# Patient Record
Sex: FEMALE | Race: WHITE | Employment: UNEMPLOYED | URBAN - NONMETROPOLITAN AREA
[De-identification: names, ages, dates, MRNs, and addresses within clinical notes are randomized per-mention and may not be internally consistent; named-entity substitution may affect disease eponyms.]

---

## 2018-01-03 ENCOUNTER — NURSE TRIAGE (OUTPATIENT)
Dept: ADMINISTRATIVE | Age: 23
End: 2018-01-03

## 2018-01-04 NOTE — TELEPHONE ENCOUNTER
oral intake without significant weight loss or symptoms of dehydration     - SEVERE: 6 or more times/day, vomits everything or nearly everything, with significant weight loss, symptoms of dehydration       Severe   2. ONSET: \"When did the vomiting begin? \"      Today   3. FLUIDS: \"What fluids or food have you vomited up today? \" \"Have you been able to keep any fluids down? \"     Everything she drank. 4. ABDOMINAL PAIN: \"Are your having any abdominal pain? \" If yes : \"How bad is it and what does it feel like? \" (e.g., crampy, dull, intermittent, constant)       Cramping. 5. DIARRHEA: \"Is there any diarrhea? \" If so, ask: \"How many times today? \"       Once today   6. CONTACTS: \"Is there anyone else in the family with the same symptoms? \"       No   7. CAUSE: \"What do you think is causing your vomiting? \"      unknown   8. HYDRATION STATUS: \"Any signs of dehydration? \" (e.g., dry mouth [not only dry lips], too weak to stand) \"When did you last urinate? \"      Just urinated. Doesn't know the color of the urine. 9. OTHER SYMPTOMS: \"Do you have any other symptoms? \" (e.g., fever, headache, vertigo, vomiting blood or coffee grounds)      No   10. PREGNANCY: \"Is there any chance you are pregnant? \" \"When was your last menstrual period? \"      No    Protocols used: DIARRHEA-ADULT-, VOMITING-ADULT-AH

## 2018-11-14 ENCOUNTER — NURSE TRIAGE (OUTPATIENT)
Dept: ADMINISTRATIVE | Age: 23
End: 2018-11-14

## 2018-12-09 ENCOUNTER — APPOINTMENT (OUTPATIENT)
Dept: GENERAL RADIOLOGY | Age: 23
DRG: 723 | End: 2018-12-09
Payer: COMMERCIAL

## 2018-12-09 ENCOUNTER — HOSPITAL ENCOUNTER (INPATIENT)
Age: 23
LOS: 1 days | Discharge: HOME | DRG: 723 | End: 2018-12-10
Attending: FAMILY MEDICINE | Admitting: HOSPITALIST
Payer: COMMERCIAL

## 2018-12-09 DIAGNOSIS — B34.9 VIRAL ILLNESS: ICD-10-CM

## 2018-12-09 DIAGNOSIS — R00.0 SINUS TACHYCARDIA: ICD-10-CM

## 2018-12-09 DIAGNOSIS — E86.0 DEHYDRATION: Primary | ICD-10-CM

## 2018-12-09 LAB
ALBUMIN SERPL-MCNC: 4.6 G/DL (ref 3.5–5.1)
ALP BLD-CCNC: 65 U/L (ref 38–126)
ALT SERPL-CCNC: 17 U/L (ref 11–66)
ANION GAP SERPL CALCULATED.3IONS-SCNC: 14 MEQ/L (ref 8–16)
AST SERPL-CCNC: 22 U/L (ref 5–40)
BASOPHILS # BLD: 0.3 %
BASOPHILS ABSOLUTE: 0 THOU/MM3 (ref 0–0.1)
BILIRUB SERPL-MCNC: 0.3 MG/DL (ref 0.3–1.2)
BILIRUBIN DIRECT: < 0.2 MG/DL (ref 0–0.3)
BILIRUBIN URINE: NEGATIVE
BLOOD, URINE: NEGATIVE
BUN BLDV-MCNC: 10 MG/DL (ref 7–22)
CALCIUM SERPL-MCNC: 9.5 MG/DL (ref 8.5–10.5)
CHARACTER, URINE: CLEAR
CHLORIDE BLD-SCNC: 98 MEQ/L (ref 98–111)
CO2: 23 MEQ/L (ref 23–33)
COLOR: YELLOW
CREAT SERPL-MCNC: 0.7 MG/DL (ref 0.4–1.2)
EKG ATRIAL RATE: 148 BPM
EKG P AXIS: 58 DEGREES
EKG P-R INTERVAL: 128 MS
EKG Q-T INTERVAL: 276 MS
EKG QRS DURATION: 96 MS
EKG QTC CALCULATION (BAZETT): 433 MS
EKG R AXIS: 85 DEGREES
EKG T AXIS: 56 DEGREES
EKG VENTRICULAR RATE: 148 BPM
EOSINOPHIL # BLD: 0.2 %
EOSINOPHILS ABSOLUTE: 0 THOU/MM3 (ref 0–0.4)
ERYTHROCYTE [DISTWIDTH] IN BLOOD BY AUTOMATED COUNT: 13.2 % (ref 11.5–14.5)
ERYTHROCYTE [DISTWIDTH] IN BLOOD BY AUTOMATED COUNT: 43.6 FL (ref 35–45)
FLU A ANTIGEN: NEGATIVE
FLU B ANTIGEN: NEGATIVE
GFR SERPL CREATININE-BSD FRML MDRD: > 90 ML/MIN/1.73M2
GLUCOSE BLD-MCNC: 128 MG/DL (ref 70–108)
GLUCOSE URINE: NEGATIVE MG/DL
HCT VFR BLD CALC: 40.7 % (ref 37–47)
HEMOGLOBIN: 13.9 GM/DL (ref 12–16)
IMMATURE GRANS (ABS): 0.05 THOU/MM3 (ref 0–0.07)
IMMATURE GRANULOCYTES: 0.4 %
KETONES, URINE: NEGATIVE
LACTIC ACID: 2 MMOL/L (ref 0.5–2.2)
LEUKOCYTE ESTERASE, URINE: NEGATIVE
LIPASE: 18.5 U/L (ref 5.6–51.3)
LYMPHOCYTES # BLD: 2.6 %
LYMPHOCYTES ABSOLUTE: 0.3 THOU/MM3 (ref 1–4.8)
MCH RBC QN AUTO: 30.7 PG (ref 26–33)
MCHC RBC AUTO-ENTMCNC: 34.2 GM/DL (ref 32.2–35.5)
MCV RBC AUTO: 89.8 FL (ref 81–99)
MONOCYTES # BLD: 6.7 %
MONOCYTES ABSOLUTE: 0.9 THOU/MM3 (ref 0.4–1.3)
NITRITE, URINE: NEGATIVE
NUCLEATED RED BLOOD CELLS: 0 /100 WBC
OSMOLALITY CALCULATION: 270.8 MOSMOL/KG (ref 275–300)
PH UA: 7
PLATELET # BLD: 219 THOU/MM3 (ref 130–400)
PMV BLD AUTO: 10.9 FL (ref 9.4–12.4)
POTASSIUM SERPL-SCNC: 3.7 MEQ/L (ref 3.5–5.2)
PREGNANCY, SERUM: NEGATIVE
PROCALCITONIN: 0.48 NG/ML (ref 0.01–0.09)
PROTEIN UA: NEGATIVE
RBC # BLD: 4.53 MILL/MM3 (ref 4.2–5.4)
SEG NEUTROPHILS: 89.8 %
SEGMENTED NEUTROPHILS ABSOLUTE COUNT: 11.6 THOU/MM3 (ref 1.8–7.7)
SODIUM BLD-SCNC: 135 MEQ/L (ref 135–145)
SPECIFIC GRAVITY, URINE: 1.02 (ref 1–1.03)
TOTAL PROTEIN: 8 G/DL (ref 6.1–8)
UROBILINOGEN, URINE: 1 EU/DL
WBC # BLD: 12.9 THOU/MM3 (ref 4.8–10.8)

## 2018-12-09 PROCEDURE — 82248 BILIRUBIN DIRECT: CPT

## 2018-12-09 PROCEDURE — 99219 PR INITIAL OBSERVATION CARE/DAY 50 MINUTES: CPT | Performed by: HOSPITALIST

## 2018-12-09 PROCEDURE — 99285 EMERGENCY DEPT VISIT HI MDM: CPT

## 2018-12-09 PROCEDURE — 84145 PROCALCITONIN (PCT): CPT

## 2018-12-09 PROCEDURE — 87804 INFLUENZA ASSAY W/OPTIC: CPT

## 2018-12-09 PROCEDURE — 96361 HYDRATE IV INFUSION ADD-ON: CPT

## 2018-12-09 PROCEDURE — 6360000002 HC RX W HCPCS

## 2018-12-09 PROCEDURE — 93005 ELECTROCARDIOGRAM TRACING: CPT | Performed by: FAMILY MEDICINE

## 2018-12-09 PROCEDURE — 80053 COMPREHEN METABOLIC PANEL: CPT

## 2018-12-09 PROCEDURE — 83690 ASSAY OF LIPASE: CPT

## 2018-12-09 PROCEDURE — 84703 CHORIONIC GONADOTROPIN ASSAY: CPT

## 2018-12-09 PROCEDURE — 96374 THER/PROPH/DIAG INJ IV PUSH: CPT

## 2018-12-09 PROCEDURE — 2580000003 HC RX 258: Performed by: FAMILY MEDICINE

## 2018-12-09 PROCEDURE — 96376 TX/PRO/DX INJ SAME DRUG ADON: CPT

## 2018-12-09 PROCEDURE — 71046 X-RAY EXAM CHEST 2 VIEWS: CPT

## 2018-12-09 PROCEDURE — 83605 ASSAY OF LACTIC ACID: CPT

## 2018-12-09 PROCEDURE — 96375 TX/PRO/DX INJ NEW DRUG ADDON: CPT

## 2018-12-09 PROCEDURE — 36415 COLL VENOUS BLD VENIPUNCTURE: CPT

## 2018-12-09 PROCEDURE — 81003 URINALYSIS AUTO W/O SCOPE: CPT

## 2018-12-09 PROCEDURE — 6360000002 HC RX W HCPCS: Performed by: FAMILY MEDICINE

## 2018-12-09 PROCEDURE — 85025 COMPLETE CBC W/AUTO DIFF WBC: CPT

## 2018-12-09 RX ORDER — ONDANSETRON 2 MG/ML
4 INJECTION INTRAMUSCULAR; INTRAVENOUS ONCE
Status: COMPLETED | OUTPATIENT
Start: 2018-12-09 | End: 2018-12-09

## 2018-12-09 RX ORDER — METOCLOPRAMIDE HYDROCHLORIDE 5 MG/ML
10 INJECTION INTRAMUSCULAR; INTRAVENOUS ONCE
Status: COMPLETED | OUTPATIENT
Start: 2018-12-10 | End: 2018-12-10

## 2018-12-09 RX ORDER — ACETAMINOPHEN 325 MG/1
650 TABLET ORAL ONCE
Status: DISCONTINUED | OUTPATIENT
Start: 2018-12-09 | End: 2018-12-10 | Stop reason: HOSPADM

## 2018-12-09 RX ORDER — METOCLOPRAMIDE HYDROCHLORIDE 5 MG/ML
INJECTION INTRAMUSCULAR; INTRAVENOUS
Status: COMPLETED
Start: 2018-12-09 | End: 2018-12-10

## 2018-12-09 RX ORDER — ONDANSETRON 2 MG/ML
INJECTION INTRAMUSCULAR; INTRAVENOUS
Status: COMPLETED
Start: 2018-12-09 | End: 2018-12-09

## 2018-12-09 RX ORDER — 0.9 % SODIUM CHLORIDE 0.9 %
30 INTRAVENOUS SOLUTION INTRAVENOUS ONCE
Status: COMPLETED | OUTPATIENT
Start: 2018-12-09 | End: 2018-12-09

## 2018-12-09 RX ADMIN — ONDANSETRON 4 MG: 2 INJECTION INTRAMUSCULAR; INTRAVENOUS at 23:23

## 2018-12-09 RX ADMIN — ONDANSETRON 4 MG: 2 INJECTION INTRAMUSCULAR; INTRAVENOUS at 21:18

## 2018-12-09 RX ADMIN — SODIUM CHLORIDE 1905 ML: 9 INJECTION, SOLUTION INTRAVENOUS at 21:18

## 2018-12-09 ASSESSMENT — ENCOUNTER SYMPTOMS
NAUSEA: 1
ABDOMINAL PAIN: 1
EYE DISCHARGE: 0
VOMITING: 1
WHEEZING: 0
BACK PAIN: 1
RHINORRHEA: 0
SORE THROAT: 0
COUGH: 0
SHORTNESS OF BREATH: 0
DIARRHEA: 0
EYE PAIN: 0

## 2018-12-10 ENCOUNTER — APPOINTMENT (OUTPATIENT)
Dept: GENERAL RADIOLOGY | Age: 23
DRG: 723 | End: 2018-12-10
Payer: COMMERCIAL

## 2018-12-10 VITALS
OXYGEN SATURATION: 100 % | DIASTOLIC BLOOD PRESSURE: 55 MMHG | BODY MASS INDEX: 23.9 KG/M2 | SYSTOLIC BLOOD PRESSURE: 99 MMHG | HEIGHT: 64 IN | TEMPERATURE: 99.4 F | RESPIRATION RATE: 24 BRPM | WEIGHT: 140 LBS | HEART RATE: 120 BPM

## 2018-12-10 PROBLEM — R50.9 FEVER AND CHILLS: Status: ACTIVE | Noted: 2018-12-10

## 2018-12-10 PROBLEM — R65.10 SIRS (SYSTEMIC INFLAMMATORY RESPONSE SYNDROME) (HCC): Status: ACTIVE | Noted: 2018-12-10

## 2018-12-10 LAB
AMPHETAMINE+METHAMPHETAMINE URINE SCREEN: NEGATIVE
BARBITURATE QUANTITATIVE URINE: NEGATIVE
BENZODIAZEPINE QUANTITATIVE URINE: NEGATIVE
CANNABINOID QUANTITATIVE URINE: NEGATIVE
COCAINE METABOLITE QUANTITATIVE URINE: NEGATIVE
GROUP A STREP CULTURE, REFLEX: NEGATIVE
OPIATES, URINE: NEGATIVE
OXYCODONE: NEGATIVE
PHENCYCLIDINE QUANTITATIVE URINE: NEGATIVE
REFLEX THROAT C + S: NORMAL
TROPONIN T: < 0.01 NG/ML

## 2018-12-10 PROCEDURE — 96365 THER/PROPH/DIAG IV INF INIT: CPT

## 2018-12-10 PROCEDURE — 87040 BLOOD CULTURE FOR BACTERIA: CPT

## 2018-12-10 PROCEDURE — 1200000000 HC SEMI PRIVATE

## 2018-12-10 PROCEDURE — 6360000002 HC RX W HCPCS

## 2018-12-10 PROCEDURE — 6370000000 HC RX 637 (ALT 250 FOR IP)

## 2018-12-10 PROCEDURE — 86038 ANTINUCLEAR ANTIBODIES: CPT

## 2018-12-10 PROCEDURE — 86255 FLUORESCENT ANTIBODY SCREEN: CPT

## 2018-12-10 PROCEDURE — 96375 TX/PRO/DX INJ NEW DRUG ADDON: CPT

## 2018-12-10 PROCEDURE — 6370000000 HC RX 637 (ALT 250 FOR IP): Performed by: INTERNAL MEDICINE

## 2018-12-10 PROCEDURE — 84484 ASSAY OF TROPONIN QUANT: CPT

## 2018-12-10 PROCEDURE — 2580000003 HC RX 258: Performed by: HOSPITALIST

## 2018-12-10 PROCEDURE — 80307 DRUG TEST PRSMV CHEM ANLYZR: CPT

## 2018-12-10 PROCEDURE — 6370000000 HC RX 637 (ALT 250 FOR IP): Performed by: HOSPITALIST

## 2018-12-10 PROCEDURE — 99239 HOSP IP/OBS DSCHRG MGMT >30: CPT | Performed by: FAMILY MEDICINE

## 2018-12-10 PROCEDURE — 71046 X-RAY EXAM CHEST 2 VIEWS: CPT

## 2018-12-10 PROCEDURE — 96361 HYDRATE IV INFUSION ADD-ON: CPT

## 2018-12-10 PROCEDURE — 6360000002 HC RX W HCPCS: Performed by: HOSPITALIST

## 2018-12-10 PROCEDURE — 93010 ELECTROCARDIOGRAM REPORT: CPT | Performed by: NUCLEAR MEDICINE

## 2018-12-10 PROCEDURE — 74018 RADEX ABDOMEN 1 VIEW: CPT

## 2018-12-10 PROCEDURE — 87070 CULTURE OTHR SPECIMN AEROBIC: CPT

## 2018-12-10 PROCEDURE — 36415 COLL VENOUS BLD VENIPUNCTURE: CPT

## 2018-12-10 PROCEDURE — 2709999900 HC NON-CHARGEABLE SUPPLY

## 2018-12-10 PROCEDURE — G0378 HOSPITAL OBSERVATION PER HR: HCPCS

## 2018-12-10 PROCEDURE — 87880 STREP A ASSAY W/OPTIC: CPT

## 2018-12-10 RX ORDER — SODIUM CHLORIDE 0.9 % (FLUSH) 0.9 %
10 SYRINGE (ML) INJECTION PRN
Status: DISCONTINUED | OUTPATIENT
Start: 2018-12-10 | End: 2018-12-10 | Stop reason: HOSPADM

## 2018-12-10 RX ORDER — IBUPROFEN 400 MG/1
400 TABLET ORAL ONCE
Status: COMPLETED | OUTPATIENT
Start: 2018-12-10 | End: 2018-12-10

## 2018-12-10 RX ORDER — ACETAMINOPHEN 325 MG/1
650 TABLET ORAL EVERY 6 HOURS PRN
Qty: 120 TABLET | Refills: 3 | COMMUNITY
Start: 2018-12-10 | End: 2021-09-21

## 2018-12-10 RX ORDER — SODIUM CHLORIDE 0.9 % (FLUSH) 0.9 %
10 SYRINGE (ML) INJECTION EVERY 12 HOURS SCHEDULED
Status: DISCONTINUED | OUTPATIENT
Start: 2018-12-10 | End: 2018-12-10 | Stop reason: HOSPADM

## 2018-12-10 RX ORDER — LABETALOL HYDROCHLORIDE 5 MG/ML
10 INJECTION, SOLUTION INTRAVENOUS EVERY 4 HOURS PRN
Status: DISCONTINUED | OUTPATIENT
Start: 2018-12-10 | End: 2018-12-10 | Stop reason: HOSPADM

## 2018-12-10 RX ORDER — 0.9 % SODIUM CHLORIDE 0.9 %
1000 INTRAVENOUS SOLUTION INTRAVENOUS ONCE
Status: COMPLETED | OUTPATIENT
Start: 2018-12-10 | End: 2018-12-10

## 2018-12-10 RX ORDER — ACETAMINOPHEN 650 MG/1
SUPPOSITORY RECTAL
Status: COMPLETED
Start: 2018-12-10 | End: 2018-12-10

## 2018-12-10 RX ORDER — ACETAMINOPHEN 650 MG/1
650 SUPPOSITORY RECTAL ONCE
Status: COMPLETED | OUTPATIENT
Start: 2018-12-10 | End: 2018-12-10

## 2018-12-10 RX ORDER — METOCLOPRAMIDE 10 MG/1
10 TABLET ORAL 2 TIMES DAILY PRN
Status: DISCONTINUED | OUTPATIENT
Start: 2018-12-10 | End: 2018-12-10 | Stop reason: HOSPADM

## 2018-12-10 RX ORDER — MORPHINE SULFATE 2 MG/ML
2 INJECTION, SOLUTION INTRAMUSCULAR; INTRAVENOUS EVERY 4 HOURS PRN
Status: DISCONTINUED | OUTPATIENT
Start: 2018-12-10 | End: 2018-12-10 | Stop reason: HOSPADM

## 2018-12-10 RX ORDER — ONDANSETRON 2 MG/ML
4 INJECTION INTRAMUSCULAR; INTRAVENOUS EVERY 6 HOURS PRN
Status: DISCONTINUED | OUTPATIENT
Start: 2018-12-10 | End: 2018-12-10 | Stop reason: HOSPADM

## 2018-12-10 RX ORDER — ONDANSETRON 4 MG/1
4 TABLET, ORALLY DISINTEGRATING ORAL EVERY 8 HOURS PRN
Qty: 20 TABLET | Refills: 0 | Status: ON HOLD | OUTPATIENT
Start: 2018-12-10 | End: 2020-09-28

## 2018-12-10 RX ORDER — ACETAMINOPHEN 325 MG/1
650 TABLET ORAL EVERY 6 HOURS PRN
Status: DISCONTINUED | OUTPATIENT
Start: 2018-12-10 | End: 2018-12-10 | Stop reason: HOSPADM

## 2018-12-10 RX ORDER — MULTIVITAMIN WITH FOLIC ACID 400 MCG
1 TABLET ORAL DAILY
Status: DISCONTINUED | OUTPATIENT
Start: 2018-12-10 | End: 2018-12-10 | Stop reason: HOSPADM

## 2018-12-10 RX ORDER — SODIUM CHLORIDE 9 MG/ML
INJECTION, SOLUTION INTRAVENOUS CONTINUOUS
Status: DISCONTINUED | OUTPATIENT
Start: 2018-12-10 | End: 2018-12-10 | Stop reason: HOSPADM

## 2018-12-10 RX ORDER — IBUPROFEN 400 MG/1
400 TABLET ORAL EVERY 6 HOURS PRN
Qty: 120 TABLET | Refills: 3 | Status: ON HOLD | COMMUNITY
Start: 2018-12-10 | End: 2020-09-28

## 2018-12-10 RX ADMIN — CEFTRIAXONE SODIUM 1 G: 1 INJECTION, POWDER, FOR SOLUTION INTRAMUSCULAR; INTRAVENOUS at 03:43

## 2018-12-10 RX ADMIN — PROCHLORPERAZINE EDISYLATE 10 MG: 5 INJECTION INTRAMUSCULAR; INTRAVENOUS at 02:11

## 2018-12-10 RX ADMIN — ACETAMINOPHEN 650 MG: 650 SUPPOSITORY RECTAL at 00:10

## 2018-12-10 RX ADMIN — METOCLOPRAMIDE 10 MG: 5 INJECTION, SOLUTION INTRAMUSCULAR; INTRAVENOUS at 00:01

## 2018-12-10 RX ADMIN — SODIUM CHLORIDE: 9 INJECTION, SOLUTION INTRAVENOUS at 04:09

## 2018-12-10 RX ADMIN — SODIUM CHLORIDE: 9 INJECTION, SOLUTION INTRAVENOUS at 06:19

## 2018-12-10 RX ADMIN — SODIUM CHLORIDE 1000 ML: 9 INJECTION, SOLUTION INTRAVENOUS at 05:16

## 2018-12-10 RX ADMIN — Medication 10 ML: at 09:10

## 2018-12-10 RX ADMIN — IBUPROFEN 400 MG: 400 TABLET, FILM COATED ORAL at 14:07

## 2018-12-10 RX ADMIN — ACETAMINOPHEN 650 MG: 325 TABLET ORAL at 03:16

## 2018-12-10 RX ADMIN — METOCLOPRAMIDE HYDROCHLORIDE 10 MG: 5 INJECTION INTRAMUSCULAR; INTRAVENOUS at 00:01

## 2018-12-10 RX ADMIN — SODIUM CHLORIDE 1000 ML: 9 INJECTION, SOLUTION INTRAVENOUS at 01:57

## 2018-12-10 RX ADMIN — MORPHINE SULFATE 2 MG: 2 INJECTION, SOLUTION INTRAMUSCULAR; INTRAVENOUS at 02:11

## 2018-12-10 ASSESSMENT — PAIN SCALES - GENERAL
PAINLEVEL_OUTOF10: 3
PAINLEVEL_OUTOF10: 0
PAINLEVEL_OUTOF10: 0
PAINLEVEL_OUTOF10: 3
PAINLEVEL_OUTOF10: 2
PAINLEVEL_OUTOF10: 6

## 2018-12-10 NOTE — PROGRESS NOTES
Recheck of pt's vitals 102.6- 97/ heart rate-20 MEWS score 7 pt resting comfortably Dr. Moris Munoz updated with pt's condition he stated he will add new ordered pt shows no sign of distress bolus of .9 infusing as ordered Hellen from resource updated on pt.

## 2018-12-10 NOTE — PROGRESS NOTES
Discharge teaching and instructions for diagnosis/procedure of viral illness completed with patient using teachback method. AVS reviewed. Printed prescription (Zofran) given to patient. Patient voiced understanding regarding prescriptions, need to schedule follow up appointment with Dr. Elsa Woodson and to call Dr. Sang Seymour if symptoms worsen, and care of self at home. Discharged in a wheelchair to  home with support per mother.

## 2018-12-10 NOTE — DISCHARGE SUMMARY
rales or rhonchi, symmetric air entry  Heart - normal rate, regular rhythm, normal S1, S2, no murmurs, rubs, clicks or gallops  Abdomen - soft, nontender, nondistended, no masses or organomegaly  bowel sounds normal  Obese: No; Protuberant: No   Neurological - alert, oriented, normal speech, no focal findings or movement disorder noted, cranial nerves II through XII intact  Extremities - peripheral pulses normal, no pedal edema, no clubbing or cyanosis  Skin - normal coloration and turgor, no rashes, no suspicious skin lesions noted    Significant Diagnostics:   Radiology:   XR CHEST STANDARD (2 VW)   Final Result   Normal chest radiographs. **This report has been created using voice recognition software. It may contain minor errors which are inherent in voice recognition technology. **      Final report electronically signed by Dr. Ericka Pichardo on 12/10/2018 10:03 AM      XR ABDOMEN (KUB) (SINGLE AP VIEW)   Final Result      Nonobstructive bowel gas pattern with a moderate amount of retained stool in the colon. Final report electronically signed by Dr. Ericka Pichardo on 12/10/2018 8:23 AM      XR CHEST STANDARD (2 VW)   Final Result   1. Increased markings at left lung base. Correlate with mild bronchitis/pneumonitis changes. **This report has been created using voice recognition software. It may contain minor errors which are inherent in voice recognition technology. **      Final report electronically signed by Dr. Romina Miller on 12/9/2018 11:35 PM          Labs:   Recent Results (from the past 72 hour(s))   EKG 12 Lead    Collection Time: 12/09/18  8:58 PM   Result Value Ref Range    Ventricular Rate 148 BPM    Atrial Rate 148 BPM    P-R Interval 128 ms    QRS Duration 96 ms    Q-T Interval 276 ms    QTc Calculation (Bazett) 433 ms    P Axis 58 degrees    R Axis 85 degrees    T Axis 56 degrees   CBC auto differential    Collection Time: 12/09/18  9:08 PM   Result Value Ref Range WBC 12.9 (H) 4.8 - 10.8 thou/mm3    RBC 4.53 4.20 - 5.40 mill/mm3    Hemoglobin 13.9 12.0 - 16.0 gm/dl    Hematocrit 40.7 37.0 - 47.0 %    MCV 89.8 81.0 - 99.0 fL    MCH 30.7 26.0 - 33.0 pg    MCHC 34.2 32.2 - 35.5 gm/dl    RDW-CV 13.2 11.5 - 14.5 %    RDW-SD 43.6 35.0 - 45.0 fL    Platelets 258 704 - 854 thou/mm3    MPV 10.9 9.4 - 12.4 fL    Seg Neutrophils 89.8 %    Lymphocytes 2.6 %    Monocytes 6.7 %    Eosinophils 0.2 %    Basophils 0.3 %    Immature Granulocytes 0.4 %    Segs Absolute 11.6 (H) 1.8 - 7.7 thou/mm3    Lymphocytes # 0.3 (L) 1.0 - 4.8 thou/mm3    Monocytes # 0.9 0.4 - 1.3 thou/mm3    Eosinophils # 0.0 0.0 - 0.4 thou/mm3    Basophils # 0.0 0.0 - 0.1 thou/mm3    Immature Grans (Abs) 0.05 0.00 - 0.07 thou/mm3    nRBC 0 /100 wbc   Basic Metabolic Panel    Collection Time: 12/09/18  9:08 PM   Result Value Ref Range    Sodium 135 135 - 145 meq/L    Potassium 3.7 3.5 - 5.2 meq/L    Chloride 98 98 - 111 meq/L    CO2 23 23 - 33 meq/L    Glucose 128 (H) 70 - 108 mg/dL    BUN 10 7 - 22 mg/dL    CREATININE 0.7 0.4 - 1.2 mg/dL    Calcium 9.5 8.5 - 10.5 mg/dL   Hepatic function panel    Collection Time: 12/09/18  9:08 PM   Result Value Ref Range    Alb 4.6 3.5 - 5.1 g/dL    Total Bilirubin 0.3 0.3 - 1.2 mg/dL    Bilirubin, Direct <0.2 0.0 - 0.3 mg/dL    Alkaline Phosphatase 65 38 - 126 U/L    AST 22 5 - 40 U/L    ALT 17 11 - 66 U/L    Total Protein 8.0 6.1 - 8.0 g/dL   Lipase    Collection Time: 12/09/18  9:08 PM   Result Value Ref Range    Lipase 18.5 5.6 - 51.3 U/L   Lactic acid, plasma    Collection Time: 12/09/18  9:08 PM   Result Value Ref Range    Lactic Acid 2.0 0.5 - 2.2 mmol/L   HCG Qualitative, Serum    Collection Time: 12/09/18  9:08 PM   Result Value Ref Range    Preg, Serum NEGATIVE NEGATIVE   Procalcitonin    Collection Time: 12/09/18  9:08 PM   Result Value Ref Range    Procalcitonin 0.48 (H) 0.01 - 0.09 ng/mL   Anion Gap    Collection Time: 12/09/18  9:08 PM   Result Value Ref Range    Anion Gap

## 2018-12-10 NOTE — ED NOTES
Pt lying on rt side. Pt nauseous and vomiting. Medicated per orders. Family at side. Resp regular. Call light in reach. Denies other needs.       Lisa Moody RN  12/09/18 2803

## 2018-12-10 NOTE — ED NOTES
RN medicated patient per [facility]. Sepsis bolus begun. Tylenol not given at this time, pt refusing stating \"I can't keep it down. \"  RN educated pt on importance of decreasing fever. Pt stated an understanding and stated \"Ill let you know when I think I can tolerate it. \"      Kathia Caban RN  12/10/18 0069

## 2018-12-10 NOTE — H&P
History & Physical        Patient:  Guero Lipoma  YOB: 1995    MRN: [id number]     Acct: [de-identified]    PCP: Guillermo Paulson MD    Date of Admission: 12/9/2018    Date of Service: Pt seen/examined on 12/09/18  and Admitted to Observation with expected LOS less than two midnights due to medical therapy. Chief Complaint:  fever    History Of Present Illness:      20 y.o. female who presented to [facility] previously healthy is admitted for fever of unknown origin. She woke up this morning with a severe migraine. Nothing improved or worsened her migraine. Her migraine improved by the afternoon but she felt generalized malaise arthralgia and myalgia. No sore throat or weight loss. Her fatigue has worsened by late evening. She spiked a fever to 102.5. She has been nausea and vomiting. She was unable to keep PO down. She is a mother of 2 children and they have been diagnosed with hand, foot, and mouth. Past Medical History:          Diagnosis Date    Abnormal Pap smear of cervix     cleared        Past Surgical History:      No past surgical history on file. Medications Prior to Admission:      Prior to Admission medications    Medication Sig Start Date End Date Taking? Authorizing Provider   acetaminophen (TYLENOL) 500 MG tablet Take 325 mg by mouth every 6 hours as needed for Pain    Historical Provider, MD   multivitamin [facility]) per tablet Take 1 tablet by mouth daily. Historical Provider, MD       Allergies:  Patient has no known allergies. Social History:      The patient currently lives at home with her family. TOBACCO:   reports that she has never smoked. She has never used smokeless tobacco.  ETOH:   reports that she does not drink alcohol. Family History:      Reviewed in detail and negative for DM, CAD, Cancer, CVA.  Positive as follows:    Family History   Problem Relation Age of Onset    No Known Problems Mother     No Known Problems Father

## 2018-12-10 NOTE — PROGRESS NOTES
Resource Nurses was called and assessed  Pt for a mews score of 6 new orders per Dr. Nano Nguyễn started bolus of .9 at 500ml/hr pharmacy notified to send Compazine for nausea pt will be given Morphine for comfort and then she will be reassessed in 1 hr.

## 2018-12-10 NOTE — CARE COORDINATION
12/10/18, 10:02 AM      Mikhail Kwok       Admitted from: ED   2046 Hospital day: 0   Location: [address] [place] [address]A Reason for admit: Fever and chills [R50.9]  SIRS (systemic inflammatory response syndrome) ([facility] [address] [R65.10]   Status: obs to inpatient  Admit order signed?: yes  PMH:  has a past medical history of Abnormal Pap smear of cervix. Procedure: na  Pertinent abnormal Imagin18   CXR    1. Increased markings at left lung base. Correlate with mild bronchitis/pneumonitis changes. Medications:  Scheduled Meds:   multivitamin  1 tablet Oral Daily    sodium chloride flush  10 mL Intravenous 2 times per day    enoxaparin  40 mg Subcutaneous Daily    cefTRIAXone (ROCEPHIN) IV  1 g Intravenous Q24H    acetaminophen  650 mg Oral Once     Continuous Infusions:   sodium chloride 125 mL/hr at 12/10/18 0619      Pertinent Info/Orders/Treatment Plan:  IV fluids, troponin, procal, lactic acid, sepsis protocol, IV antibiotic, blood culture, nausea and pain control, ID consult    Diet: DIET LOW FAT;   Smoking status:  reports that she has never smoked. She has never used smokeless tobacco.   PCP: Jayden Hickman MD  Readmission: no  Readmission Risk Score: 0%    Discharge Planning  Current Residence:  Private Residence  Living Arrangements:  Children, Spouse/Significant Other   Support Systems:  Spouse/Significant Other, Children, Family Members  Current Services PTA:     Potential Assistance Needed:  N/A  Potential Assistance Purchasing Medications:  No  Does patient want to participate in local refill/ meds to beds program?  No  Type of Home Care Services:  None  Patient expects to be discharged to:  home  Expected Discharge date:  18  Follow Up Appointment: Best Day/ Time:      Discharge Plan: Met with Mitali Dawson this am; she lives home with her  an 2 children.  She drives, has PCP, is able to afford meds at discharge, she uses no DME at home and she shares this all started with a migraine

## 2018-12-10 NOTE — ED NOTES
Pt resting on cot at this time. RN discussed POC with pt and informed her of the crystal for a urine specimen. Pt stated an understanding at this time. Flu swab obtained and sent to lab. Pt denies needs at this time. Heart rate 129 at this time. Pt still does not think she can tolerate the tylenol.       Angelique Cole RN  12/10/18 2106

## 2018-12-10 NOTE — CONSULTS
smokeless tobacco.     ETOH:   reports that she does not drink alcohol. Patient currently lives with family,  and two children      FAMILY HISTORY:     Family History   Problem Relation Age of Onset    No Known Problems Mother     No Known Problems Father        REVIEW OF SYSTEMS:     Constitutional:  +Fever, no night sweats, no fatigue. Head: +head ache, + migraine, no head injury. Eye: denies photophobia, no blurring of vision, no double vision. Ears: no hearing difficulty, no tinnitus  Mouth/throat: +Sore throat, no ulceration, dental caries , dysphagia  Lungs: no cough no chest pain  CVS: no palpitation, no chest pain, no shortness of breath  GI: no abdominal pain, no nausea , no vomiting, no constipation  LROI: no dysuria, frequency and urgency, no hematuria, no kidney stones  Musculoskeletal: no joint pain, swelling , stiffness,  Endocrine: no polyuria, polydipsia, no cold or heat intolerance  Hematology: no anemia, no easy brusing or bleeding, no hx of clotting disorder  Dermatology: no skin rash, no eczema, no pruritis,  Psychiatry: no depression, no anxiety,no panic attacks, no suicide ideation    PHYSICAL EXAM:     BP (!) 104/56   Pulse 122   Temp 98.6 °F (37 °C) (Oral)   Resp 16   Ht 5' 4\" (1.626 m)   Wt 140 lb (63.5 kg)   LMP 11/25/2018   SpO2 100%   BMI 24.03 kg/m²   General:  Awake, alert, not in distress. HEENT: Swollen tonsils without exudate or petechial rash, pink conjunctiva, unicteric sclera, moist oral mucosa. Chest:  Clear to auscultation bilaterally  Cardiovascular:  RRR ,S1S2, no murmur or gallop. Abdomen:  Soft, non tender to palpation, no organomegaly   Extremities: no rash or edema   Skin:  Warm and dry.   CNS:oriented to person place and time        LABS:     CBC:   Recent Labs      12/09/18 2108   WBC  12.9*   HGB  13.9   PLT  219     BMP:    Recent Labs      12/09/18 2108   NA  135   K  3.7   CL  98   CO2  23   BUN  10   CREATININE  0.7   GLUCOSE  128*

## 2018-12-10 NOTE — ED NOTES
In to check on pt. Pt resting on cot vomitting in emesis bag. Pt very pale. Pt reports \"I cant stop puking I feel so awful. \" Pt status updated with Dr Bisi Mercado. Pt medicated per order. Pt denies pain, just reports nausea.       Kary Franco RN  12/10/18 4633

## 2018-12-12 LAB
ANA SCREEN: NORMAL
NEUTROPHIL CYTOPLASMIC AB IGG: < 1
THROAT/NOSE CULTURE: NORMAL

## 2018-12-12 NOTE — CARE COORDINATION
12/12/18, 10:16 AM  Discharged 12/10/18 per Aren Zapata RN, no needs        Discharge plan discussed by  and . Discharge plan reviewed with patient/ family. Patient/ family verbalize understanding of discharge plan and are in agreement with plan. Understanding was demonstrated using the teach back method.

## 2018-12-15 LAB — BLOOD CULTURE, ROUTINE: NORMAL

## 2020-09-28 PROBLEM — O26.90 PREGNANCY COMPLICATION, ANTEPARTUM: Status: ACTIVE | Noted: 2020-09-28

## 2021-01-12 ENCOUNTER — HOSPITAL ENCOUNTER (INPATIENT)
Age: 26
LOS: 2 days | Discharge: HOME | DRG: 560 | End: 2021-01-14
Attending: OBSTETRICS & GYNECOLOGY | Admitting: OBSTETRICS & GYNECOLOGY
Payer: COMMERCIAL

## 2021-01-12 ENCOUNTER — ANESTHESIA EVENT (OUTPATIENT)
Dept: LABOR AND DELIVERY | Age: 26
DRG: 560 | End: 2021-01-12
Payer: COMMERCIAL

## 2021-01-12 ENCOUNTER — ANESTHESIA (OUTPATIENT)
Dept: LABOR AND DELIVERY | Age: 26
DRG: 560 | End: 2021-01-12
Payer: COMMERCIAL

## 2021-01-12 LAB
ABO: NORMAL
AMNISURE PATIENT RESULT: NORMAL
AMPHETAMINE+METHAMPHETAMINE URINE SCREEN: NEGATIVE
ANTIBODY SCREEN: NORMAL
BARBITURATE QUANTITATIVE URINE: NEGATIVE
BASOPHILS # BLD: 0.2 %
BASOPHILS ABSOLUTE: 0 THOU/MM3 (ref 0–0.1)
BENZODIAZEPINE QUANTITATIVE URINE: NEGATIVE
CANNABINOID QUANTITATIVE URINE: NEGATIVE
COCAINE METABOLITE QUANTITATIVE URINE: NEGATIVE
EOSINOPHIL # BLD: 0.9 %
EOSINOPHILS ABSOLUTE: 0.1 THOU/MM3 (ref 0–0.4)
ERYTHROCYTE [DISTWIDTH] IN BLOOD BY AUTOMATED COUNT: 15.5 % (ref 11.5–14.5)
ERYTHROCYTE [DISTWIDTH] IN BLOOD BY AUTOMATED COUNT: 53 FL (ref 35–45)
HCT VFR BLD CALC: 36.8 % (ref 37–47)
HEMOGLOBIN: 12.3 GM/DL (ref 12–16)
IMMATURE GRANS (ABS): 0.03 THOU/MM3 (ref 0–0.07)
IMMATURE GRANULOCYTES: 0.4 %
LYMPHOCYTES # BLD: 20.6 %
LYMPHOCYTES ABSOLUTE: 1.8 THOU/MM3 (ref 1–4.8)
MCH RBC QN AUTO: 31.5 PG (ref 26–33)
MCHC RBC AUTO-ENTMCNC: 33.4 GM/DL (ref 32.2–35.5)
MCV RBC AUTO: 94.1 FL (ref 81–99)
MONOCYTES # BLD: 9.4 %
MONOCYTES ABSOLUTE: 0.8 THOU/MM3 (ref 0.4–1.3)
NUCLEATED RED BLOOD CELLS: 0 /100 WBC
OPIATES, URINE: NEGATIVE
OXYCODONE: NEGATIVE
PHENCYCLIDINE QUANTITATIVE URINE: NEGATIVE
PLATELET # BLD: 196 THOU/MM3 (ref 130–400)
PMV BLD AUTO: 11.5 FL (ref 9.4–12.4)
RBC # BLD: 3.91 MILL/MM3 (ref 4.2–5.4)
RH FACTOR: NORMAL
RPR: NONREACTIVE
SEG NEUTROPHILS: 68.5 %
SEGMENTED NEUTROPHILS ABSOLUTE COUNT: 5.8 THOU/MM3 (ref 1.8–7.7)
WBC # BLD: 8.5 THOU/MM3 (ref 4.8–10.8)

## 2021-01-12 PROCEDURE — 2580000003 HC RX 258: Performed by: OBSTETRICS & GYNECOLOGY

## 2021-01-12 PROCEDURE — 1200000000 HC SEMI PRIVATE

## 2021-01-12 PROCEDURE — 86592 SYPHILIS TEST NON-TREP QUAL: CPT

## 2021-01-12 PROCEDURE — 6370000000 HC RX 637 (ALT 250 FOR IP): Performed by: OBSTETRICS & GYNECOLOGY

## 2021-01-12 PROCEDURE — 86900 BLOOD TYPING SEROLOGIC ABO: CPT

## 2021-01-12 PROCEDURE — 7200000001 HC VAGINAL DELIVERY

## 2021-01-12 PROCEDURE — 80307 DRUG TEST PRSMV CHEM ANLYZR: CPT

## 2021-01-12 PROCEDURE — 6360000002 HC RX W HCPCS: Performed by: REGISTERED NURSE

## 2021-01-12 PROCEDURE — 3700000025 EPIDURAL BLOCK: Performed by: ANESTHESIOLOGY

## 2021-01-12 PROCEDURE — 86850 RBC ANTIBODY SCREEN: CPT

## 2021-01-12 PROCEDURE — 2500000003 HC RX 250 WO HCPCS: Performed by: ANESTHESIOLOGY

## 2021-01-12 PROCEDURE — 86901 BLOOD TYPING SEROLOGIC RH(D): CPT

## 2021-01-12 PROCEDURE — 85025 COMPLETE CBC W/AUTO DIFF WBC: CPT

## 2021-01-12 PROCEDURE — 36415 COLL VENOUS BLD VENIPUNCTURE: CPT

## 2021-01-12 PROCEDURE — 6360000002 HC RX W HCPCS

## 2021-01-12 PROCEDURE — 84112 EVAL AMNIOTIC FLUID PROTEIN: CPT

## 2021-01-12 PROCEDURE — 0HQ9XZZ REPAIR PERINEUM SKIN, EXTERNAL APPROACH: ICD-10-PCS | Performed by: OBSTETRICS & GYNECOLOGY

## 2021-01-12 RX ORDER — MISOPROSTOL 200 UG/1
1000 TABLET ORAL PRN
Status: DISCONTINUED | OUTPATIENT
Start: 2021-01-12 | End: 2021-01-12 | Stop reason: HOSPADM

## 2021-01-12 RX ORDER — ROPIVACAINE HYDROCHLORIDE 2 MG/ML
INJECTION, SOLUTION EPIDURAL; INFILTRATION; PERINEURAL PRN
Status: DISCONTINUED | OUTPATIENT
Start: 2021-01-12 | End: 2021-01-12 | Stop reason: SDUPTHER

## 2021-01-12 RX ORDER — ACETAMINOPHEN 325 MG/1
650 TABLET ORAL EVERY 4 HOURS PRN
Status: DISCONTINUED | OUTPATIENT
Start: 2021-01-12 | End: 2021-01-12 | Stop reason: HOSPADM

## 2021-01-12 RX ORDER — NALBUPHINE HCL 10 MG/ML
5 AMPUL (ML) INJECTION EVERY 4 HOURS PRN
Status: DISCONTINUED | OUTPATIENT
Start: 2021-01-12 | End: 2021-01-12 | Stop reason: HOSPADM

## 2021-01-12 RX ORDER — SODIUM CHLORIDE, SODIUM LACTATE, POTASSIUM CHLORIDE, CALCIUM CHLORIDE 600; 310; 30; 20 MG/100ML; MG/100ML; MG/100ML; MG/100ML
INJECTION, SOLUTION INTRAVENOUS CONTINUOUS
Status: DISCONTINUED | OUTPATIENT
Start: 2021-01-12 | End: 2021-01-13

## 2021-01-12 RX ORDER — BUTORPHANOL TARTRATE 1 MG/ML
1 INJECTION, SOLUTION INTRAMUSCULAR; INTRAVENOUS
Status: DISCONTINUED | OUTPATIENT
Start: 2021-01-12 | End: 2021-01-12 | Stop reason: HOSPADM

## 2021-01-12 RX ORDER — MORPHINE SULFATE 4 MG/ML
4 INJECTION, SOLUTION INTRAMUSCULAR; INTRAVENOUS
Status: DISCONTINUED | OUTPATIENT
Start: 2021-01-12 | End: 2021-01-12 | Stop reason: HOSPADM

## 2021-01-12 RX ORDER — ONDANSETRON 2 MG/ML
8 INJECTION INTRAMUSCULAR; INTRAVENOUS EVERY 6 HOURS PRN
Status: DISCONTINUED | OUTPATIENT
Start: 2021-01-12 | End: 2021-01-12 | Stop reason: HOSPADM

## 2021-01-12 RX ORDER — NALOXONE HYDROCHLORIDE 0.4 MG/ML
0.4 INJECTION, SOLUTION INTRAMUSCULAR; INTRAVENOUS; SUBCUTANEOUS PRN
Status: DISCONTINUED | OUTPATIENT
Start: 2021-01-12 | End: 2021-01-12 | Stop reason: HOSPADM

## 2021-01-12 RX ORDER — CARBOPROST TROMETHAMINE 250 UG/ML
250 INJECTION, SOLUTION INTRAMUSCULAR PRN
Status: CANCELLED | OUTPATIENT
Start: 2021-01-12

## 2021-01-12 RX ORDER — DIPHENHYDRAMINE HYDROCHLORIDE 50 MG/ML
25 INJECTION INTRAMUSCULAR; INTRAVENOUS EVERY 4 HOURS PRN
Status: DISCONTINUED | OUTPATIENT
Start: 2021-01-12 | End: 2021-01-12 | Stop reason: HOSPADM

## 2021-01-12 RX ORDER — SEVOFLURANE 250 ML/250ML
1 LIQUID RESPIRATORY (INHALATION) CONTINUOUS PRN
Status: DISCONTINUED | OUTPATIENT
Start: 2021-01-12 | End: 2021-01-12 | Stop reason: HOSPADM

## 2021-01-12 RX ORDER — MORPHINE SULFATE 2 MG/ML
2 INJECTION, SOLUTION INTRAMUSCULAR; INTRAVENOUS
Status: DISCONTINUED | OUTPATIENT
Start: 2021-01-12 | End: 2021-01-12 | Stop reason: HOSPADM

## 2021-01-12 RX ORDER — METHYLERGONOVINE MALEATE 0.2 MG/ML
200 INJECTION INTRAVENOUS PRN
Status: DISCONTINUED | OUTPATIENT
Start: 2021-01-12 | End: 2021-01-12 | Stop reason: HOSPADM

## 2021-01-12 RX ORDER — SODIUM CHLORIDE 0.9 % (FLUSH) 0.9 %
10 SYRINGE (ML) INJECTION PRN
Status: DISCONTINUED | OUTPATIENT
Start: 2021-01-12 | End: 2021-01-12 | Stop reason: HOSPADM

## 2021-01-12 RX ORDER — ROPIVACAINE HYDROCHLORIDE 2 MG/ML
INJECTION, SOLUTION EPIDURAL; INFILTRATION; PERINEURAL
Status: COMPLETED
Start: 2021-01-12 | End: 2021-01-12

## 2021-01-12 RX ORDER — TERBUTALINE SULFATE 1 MG/ML
0.25 INJECTION, SOLUTION SUBCUTANEOUS
Status: DISCONTINUED | OUTPATIENT
Start: 2021-01-12 | End: 2021-01-12 | Stop reason: HOSPADM

## 2021-01-12 RX ORDER — LIDOCAINE HYDROCHLORIDE 10 MG/ML
30 INJECTION, SOLUTION EPIDURAL; INFILTRATION; INTRACAUDAL; PERINEURAL PRN
Status: DISCONTINUED | OUTPATIENT
Start: 2021-01-12 | End: 2021-01-12 | Stop reason: HOSPADM

## 2021-01-12 RX ORDER — IBUPROFEN 800 MG/1
800 TABLET ORAL EVERY 8 HOURS PRN
Status: DISCONTINUED | OUTPATIENT
Start: 2021-01-12 | End: 2021-01-12 | Stop reason: HOSPADM

## 2021-01-12 RX ORDER — ONDANSETRON 2 MG/ML
4 INJECTION INTRAMUSCULAR; INTRAVENOUS EVERY 6 HOURS PRN
Status: DISCONTINUED | OUTPATIENT
Start: 2021-01-12 | End: 2021-01-12 | Stop reason: HOSPADM

## 2021-01-12 RX ORDER — SODIUM CHLORIDE 0.9 % (FLUSH) 0.9 %
10 SYRINGE (ML) INJECTION EVERY 12 HOURS SCHEDULED
Status: DISCONTINUED | OUTPATIENT
Start: 2021-01-12 | End: 2021-01-12 | Stop reason: HOSPADM

## 2021-01-12 RX ADMIN — IBUPROFEN 800 MG: 800 TABLET, FILM COATED ORAL at 22:13

## 2021-01-12 RX ADMIN — ROPIVACAINE HYDROCHLORIDE 10 ML: 2 INJECTION, SOLUTION EPIDURAL; INFILTRATION at 16:05

## 2021-01-12 RX ADMIN — BENZOCAINE AND LEVOMENTHOL: 200; 5 SPRAY TOPICAL at 22:12

## 2021-01-12 RX ADMIN — SODIUM CHLORIDE, POTASSIUM CHLORIDE, SODIUM LACTATE AND CALCIUM CHLORIDE: 600; 310; 30; 20 INJECTION, SOLUTION INTRAVENOUS at 14:30

## 2021-01-12 RX ADMIN — Medication 166 ML: at 21:25

## 2021-01-12 RX ADMIN — Medication 1 MILLI-UNITS/MIN: at 18:33

## 2021-01-12 RX ADMIN — SODIUM CHLORIDE, POTASSIUM CHLORIDE, SODIUM LACTATE AND CALCIUM CHLORIDE: 600; 310; 30; 20 INJECTION, SOLUTION INTRAVENOUS at 05:20

## 2021-01-12 RX ADMIN — Medication 18 ML/HR: at 16:10

## 2021-01-12 RX ADMIN — SODIUM CHLORIDE, POTASSIUM CHLORIDE, SODIUM LACTATE AND CALCIUM CHLORIDE: 600; 310; 30; 20 INJECTION, SOLUTION INTRAVENOUS at 19:51

## 2021-01-12 RX ADMIN — SODIUM CHLORIDE, POTASSIUM CHLORIDE, SODIUM LACTATE AND CALCIUM CHLORIDE: 600; 310; 30; 20 INJECTION, SOLUTION INTRAVENOUS at 06:24

## 2021-01-12 ASSESSMENT — PAIN DESCRIPTION - DESCRIPTORS
DESCRIPTORS: CRAMPING;TIGHTNESS
DESCRIPTORS: CRAMPING;TINGLING
DESCRIPTORS: PRESSURE
DESCRIPTORS: PRESSURE

## 2021-01-12 ASSESSMENT — PAIN DESCRIPTION - ONSET: ONSET: ON-GOING

## 2021-01-12 ASSESSMENT — PAIN SCALES - GENERAL: PAINLEVEL_OUTOF10: 5

## 2021-01-12 ASSESSMENT — PAIN DESCRIPTION - ORIENTATION: ORIENTATION: ANTERIOR

## 2021-01-12 ASSESSMENT — PAIN DESCRIPTION - FREQUENCY: FREQUENCY: INTERMITTENT

## 2021-01-12 ASSESSMENT — PAIN DESCRIPTION - PAIN TYPE: TYPE: ACUTE PAIN

## 2021-01-12 ASSESSMENT — PAIN DESCRIPTION - LOCATION: LOCATION: ABDOMEN

## 2021-01-12 NOTE — FLOWSHEET NOTE
Fetal monitor cords unplugged and wants to go to the bathroom and has been on birthing ball since the last time up to the bathroom was finished.

## 2021-01-12 NOTE — FLOWSHEET NOTE
Spoke with Dr. Gabrielle Loera on the phone and updated on patient arrival for SROM around [phone] this morning at home, 38w4d, , patient is not aware of contractions at this time, TOCO is showing irregular ones, FHTs are reassuring and patient is GBS negative. VE is 1cm and thick. Orders received.

## 2021-01-12 NOTE — FLOWSHEET NOTE
Discussed POC with patient and spouse and they are in agreement with starting pitocin in an hour if needed.

## 2021-01-12 NOTE — FLOWSHEET NOTE
Discussed POC with patient and the need to start pitocin for more regular contractions, the patient asked if we could wait to see if her body with do it without pitocin.  was updated and states we will wait for an hour to see what her body does and start pitocin if not in an adequate contractions pattern. Will continue to monitor.

## 2021-01-12 NOTE — PLAN OF CARE
Problem: Anxiety:  Goal: Level of anxiety will decrease  Description: Level of anxiety will decrease  1/12/2021 0758 by Chris Mario RN  Outcome: Ongoing  Note: States that does not feel nervous and has trust in God and  supportive at side. 1/12/2021 0610 by Freddie Gray RN  Note: Patient appears to be mildly anxious, all plans and procedures explained and questions answered. Problem: Breathing Pattern - Ineffective:  Goal: Able to breathe comfortably  Description: Able to breathe comfortably  1/12/2021 0758 by Chris Mario RN  Outcome: Ongoing  Note:  Breathing normal and oxygen level is normal.  1/12/2021 0610 by Freddie Gray RN  Note: Patient is resting in bed with even and easy respirations at this time. Problem: Fluid Volume - Imbalance:  Goal: Absence of intrapartum hemorrhage signs and symptoms  Description: Absence of intrapartum hemorrhage signs and symptoms  1/12/2021 0758 by Chris Mario RN  Outcome: Ongoing  Note: No vaginal bleeding and will continue to observe and Karin Bell at side. 1/12/2021 0610 by Freddie Gray RN  Note: Patient is not showing any signs of beeding at this time, will continue to  monitor through labor process. Problem: Infection - Intrapartum Infection:  Goal: Will show no infection signs and symptoms  Description: Will show no infection signs and symptoms  1/12/2021 0758 by Chris Mario RN  Outcome: Ongoing  Note: Afebrile will continue to observe. 1/12/2021 0610 by Freddie Gray RN  Note: Patient Luna Limmino is rupture with clear fluid noted and patient is afebrile at this time. Problem: Labor Process - Prolonged:  Goal: Uterine contractions within specified parameters  Description: Uterine contractions within specified parameters  1/12/2021 0758 by Chris Mario RN  Outcome: Ongoing  Note: Uterine contractions are every 1.5-2 minutes.   1/12/2021 0610 by Freddie Gray RN  Note: TOCO in place with irregular contractions noted at this time. Pitocin will be started for labor augmentations. Problem: Pain - Acute:  Goal: Able to cope with pain  Description: Able to cope with pain  1/12/2021 0758 by Juvenal Jolly RN  Outcome: Ongoing  Note: Coping with contractions pain and breathes through the contractions well. 1/12/2021 0610 by Pricilla Tristan RN  Note: Patient is planning on a labor epidural for contractions and delivery pain. Problem: Tissue Perfusion - Uteroplacental, Altered:  Goal: Absence of abnormal fetal heart rate pattern  Description: Absence of abnormal fetal heart rate pattern  1/12/2021 0758 by Juvenal Jolly RN  Outcome: Ongoing  Note: Reactive fetal tracing. 1/12/2021 0610 by Pricilla Tristan RN  Note: FHTs are reassuring at this time and being cont. Monitred. Problem: Urinary Retention:  Goal: Urinary elimination within specified parameters  Description: Urinary elimination within specified parameters  1/12/2021 0758 by Juvenal Jolly RN  Outcome: Ongoing  Note: Has been voiding well and bladder is not distended. 1/12/2021 0610 by Pricilla Tristan RN  Note: Patient is able to void in the RR as needed at this time. Goodman will be placed after epidural.      Problem: Urinary Retention:  Intervention: Assess need for urinary catheter  Note: Planning on epidural and so far has been voiding well and denies needs for epidural.     Problem: Discharge Planning:  Goal: Discharged to appropriate level of care  Description: Discharged to appropriate level of care  1/12/2021 0758 by Juvenal Jolly RN  Outcome: Ongoing  Note: Planning to be discharged 1-2 days after delivery. 1/12/2021 0610 by Pricilla Tristan RN  Note: Patient will be transferred to [place] [address] after delivery recovery.       Problem: Pain:  Goal: Pain level will decrease  Description: Pain level will decrease  Outcome: Ongoing  Note: Breathing through each contraction well and denies needs for pain relief. Problem: Pain:  Goal: Control of acute pain  Description: Control of acute pain  Outcome: Ongoing  Note: Breathing well with contractions and when asked about pain goal says it is hard and cannot decide what to say. Problem: Pain:  Goal: Control of chronic pain  Description: Control of chronic pain  Outcome: Ongoing  Note: Chronic pain is not applicable. Problem: Falls - Risk of:  Goal: Will remain free from falls  Description: Will remain free from falls  Outcome: Ongoing  Note: Instructed to call for assist as needed. Instructed to call for assist as needed. Problem: Falls - Risk of:  Goal: Absence of physical injury  Description: Absence of physical injury  Outcome: Ongoing  Note: No injury and told to call for help for any reason or when wanting out of bed. Wheel is locked and call light in place. Care plan reviewed with patient and they verbalize understanding of the plan of care and contribute to goal setting.

## 2021-01-12 NOTE — ANESTHESIA PRE PROCEDURE
Department of Anesthesiology  Preprocedure Note       Name:  Lazarus Bacca   Age:  30 y.o.  :  1995                                          MRN:  [id number]         Date:  2021      Surgeon: * No surgeons listed *    Procedure: * No procedures listed *    Medications prior to admission:   Prior to Admission medications    Medication Sig Start Date End Date Taking?  Authorizing Provider   Prenatal MV-Min-Fe Fum-FA-DHA (PRENATAL 1 PO) Take 1 tablet by mouth    Historical Provider, MD   acetaminophen (TYLENOL) 325 MG tablet Take 2 tablets by mouth every 6 hours as needed for Pain or Fever 12/10/18   Belkis Barragan MD       Current medications:    Current Facility-Administered Medications   Medication Dose Route Frequency Provider Last Rate Last Admin    oxytocin (PITOCIN) 30 units in 500 mL infusion  1 sunny-units/min Intravenous Continuous Cherelletyler iHdalgo DO        terbutaline (BRETHINE) injection 0.25 mg  0.25 mg Subcutaneous Once PRN Cherelle Hidalgo, DO        lactated ringers infusion   Intravenous Continuous Cherelle Hidalgo  mL/hr at 21 1430 New Bag at 21 1430    sodium chloride flush 0.9 % injection 10 mL  10 mL Intravenous 2 times per day Cherelle Hidalgo, DO        sodium chloride flush 0.9 % injection 10 mL  10 mL Intravenous PRN Cherelle Hidalgo, DO        lidocaine PF 1 % injection 30 mL  30 mL Other PRN Cherelle Hidalgo, DO        butorphanol (STADOL) injection 1 mg  1 mg Intravenous Q1H PRN Cherelle Hidalgo, DO        nitrous oxide 50% inhalation 1 each  1 each Inhalation Continuous PRN Cherelle Hidalgo DO        ondansetron [facility]) injection 8 mg  8 mg Intravenous Q6H PRN Cherelle Hidalgo, DO        diphenhydrAMINE (BENADRYL) injection 25 mg  25 mg Intravenous Q4H PRN Cherelle Hidalgo, DO        oxytocin (PITOCIN) 10 unit bolus from the bag  9.96 Units Intravenous PRN Cherelle Hidalgo, DO        And  oxytocin (PITOCIN) 30 units in 500 mL infusion  50 sunny-units/min Intravenous PRN Shelvy Fillers, DO        methylergonovine (METHERGINE) injection 200 mcg  200 mcg Intramuscular PRN Shelvy Fillers, DO        miSOPROStol (CYTOTEC) tablet 1,000 mcg  1,000 mcg Rectal PRN Shelvy Fillers, DO        acetaminophen (TYLENOL) tablet 650 mg  650 mg Oral Q4H PRN Shelvy Fillers, DO        ibuprofen (ADVIL;MOTRIN) tablet 800 mg  800 mg Oral Q8H PRN Shelvy Fillers, DO        morphine (PF) injection 2 mg  2 mg Intravenous Q2H PRN Shelvy Fillers, DO        Or    morphine injection 4 mg  4 mg Intravenous Q2H PRN Shelvy Fillers, DO        witch hazel-glycerin (TUCKS) pad   Topical PRN Shelvy Fillers, DO        benzocaine-menthol (DERMOPLAST) 20-0.5 % spray   Topical PRN Shelvy Fillers, DO        naloxone [facility]) injection 0.4 mg  0.4 mg Intravenous PRN Jesika Alvarez Rivas, DO        nalbuphine (NUBAIN) injection 5 mg  5 mg Intravenous Q4H PRN Jesika Alvarez Rivas, DO        [facility]ron [facility]) injection 4 mg  4 mg Intravenous Q6H PRN Jesika Alvarez Rivas, DO        fentaNYL 750 mcg, ropivacaine 0.1% in sodium chloride 0.9% 265mL (OB) epidural  18 mL/hr Epidural Continuous Siva Rivas, DO        ropivacaine (NAROPIN) 0.2% injection 0.2%                Allergies:  No Known Allergies    Problem List:    Patient Active Problem List   Diagnosis Code    Fever and chills R50.9    SIRS (systemic inflammatory response syndrome) (HCC) R65.10    Sinus tachycardia R00.0    Viral illness B34.9    Migraine without aura and without status migrainosus, not intractable G43.009    Leukocytosis D72.829    Pregnancy complication, antepartum O26.90       Past Medical History:        Diagnosis Date    Abnormal Pap smear of cervix     cleared        Past Surgical History:  History reviewed. No pertinent surgical history.     Social History:    Social History     Tobacco Use  Smoking status: Never Smoker    Smokeless tobacco: Never Used   Substance Use Topics    Alcohol use: No                                Counseling given: Not Answered      Vital Signs (Current):   Vitals:    01/12/21 1417 01/12/21 1440 01/12/21 1505 01/12/21 1600   BP:  114/69 129/68 (!) 107/59   Pulse:  76 78 90   Resp:  16 18 20   Temp: 36.3 °C (97.3 °F)  37.1 °C (98.8 °F)    TempSrc: Oral  Oral    SpO2:    100%   Weight:       Height:                                                  BP Readings from Last 3 Encounters:   01/12/21 (!) 107/59   09/28/20 114/73   12/10/18 (!) 99/55       NPO Status: Time of last liquid consumption: 0519                        Time of last solid consumption: 1900                        Date of last liquid consumption: 01/12/21                        Date of last solid food consumption: 01/12/21    BMI:   Wt Readings from Last 3 Encounters:   01/12/21 180 lb (81.6 kg)   09/28/20 157 lb 12.8 oz (71.6 kg)   12/09/18 140 lb (63.5 kg)     Body mass index is 31.89 kg/m². CBC:   Lab Results   Component Value Date    WBC 8.5 01/12/2021    RBC 3.91 01/12/2021    RBC 4.31 10/25/2011    HGB 12.3 01/12/2021    HCT 36.8 01/12/2021    MCV 94.1 01/12/2021    RDW 16.0 11/18/2016     01/12/2021       CMP:   Lab Results   Component Value Date     12/09/2018    K 3.7 12/09/2018    CL 98 12/09/2018    CO2 23 12/09/2018    BUN 10 12/09/2018    CREATININE 0.7 12/09/2018    LABGLOM >90 12/09/2018    GLUCOSE 128 12/09/2018    GLUCOSE NEGATIVE 10/25/2011    PROT 8.0 12/09/2018    CALCIUM 9.5 12/09/2018    BILITOT 0.3 12/09/2018    ALKPHOS 65 12/09/2018    AST 22 12/09/2018    ALT 17 12/09/2018       POC Tests: No results for input(s): POCGLU, POCNA, POCK, POCCL, POCBUN, POCHEMO, POCHCT in the last 72 hours.     Coags: No results found for: PROTIME, INR, APTT    HCG (If Applicable):   Lab Results   Component Value Date    PREGSERUM NEGATIVE 12/09/2018 ABGs: No results found for: PHART, PO2ART, RCO5EXR, XDX6IRK, BEART, O0DEYUDZ     Type & Screen (If Applicable):  Lab Results   Component Value Date    LABRH POS 01/12/2021       Drug/Infectious Status (If Applicable):  No results found for: HIV, HEPCAB    COVID-19 Screening (If Applicable): No results found for: COVID19      Anesthesia Evaluation  Patient summary reviewed no history of anesthetic complications:   Airway: Mallampati: II        Dental: normal exam         Pulmonary:Negative Pulmonary ROS and normal exam                               Cardiovascular:Negative CV ROS          ECG reviewed                        Neuro/Psych:   (+) headaches: migraine headaches,             GI/Hepatic/Renal: Neg GI/Hepatic/Renal ROS            Endo/Other: Negative Endo/Other ROS                    Abdominal:           Vascular: negative vascular ROS. Anesthesia Plan      epidural     ASA 2             Anesthetic plan and risks discussed with patient and spouse. Plan discussed with attending.                   Ludie Peabody, APRN - CRNA   1/12/2021

## 2021-01-12 NOTE — FLOWSHEET NOTE
Blood bank called and informed Tasneem Farrar that the computer is showing in 2012 that the blood type is RH negative from external lab and she states the bloody type shows that the blood type is RH positive drawn three times by [facility].

## 2021-01-12 NOTE — PLAN OF CARE
Problem: Anxiety:  Goal: Level of anxiety will decrease  Description: Level of anxiety will decrease  Note: Patient appears to be mildly anxious, all plans and procedures explained and questions answered. Problem: Breathing Pattern - Ineffective:  Goal: Able to breathe comfortably  Description: Able to breathe comfortably  Note: Patient is resting in bed with even and easy respirations at this time. Problem: Fluid Volume - Imbalance:  Goal: Absence of intrapartum hemorrhage signs and symptoms  Description: Absence of intrapartum hemorrhage signs and symptoms  Note: Patient is not showing any signs of beeding at this time, will continue to  monitor through labor process. Problem: Infection - Intrapartum Infection:  Goal: Will show no infection signs and symptoms  Description: Will show no infection signs and symptoms  Note: Patient Antony Manuela is rupture with clear fluid noted and patient is afebrile at this time. Problem: Labor Process - Prolonged:  Goal: Uterine contractions within specified parameters  Description: Uterine contractions within specified parameters  Note: TOCO in place with irregular contractions noted at this time. Pitocin will be started for labor augmentations. Problem: Pain - Acute:  Goal: Able to cope with pain  Description: Able to cope with pain  Note: Patient is planning on a labor epidural for contractions and delivery pain. Problem: Tissue Perfusion - Uteroplacental, Altered:  Goal: Absence of abnormal fetal heart rate pattern  Description: Absence of abnormal fetal heart rate pattern  Note: FHTs are reassuring at this time and being cont. Monitred. Problem: Urinary Retention:  Goal: Urinary elimination within specified parameters  Description: Urinary elimination within specified parameters  Note: Patient is able to void in the RR as needed at this time.  Goodman will be placed after epidural.      Problem: Discharge Planning:  Goal: Discharged to appropriate

## 2021-01-12 NOTE — ANESTHESIA PROCEDURE NOTES
Epidural Block    Patient location during procedure: OB  Start time: 1/12/2021 3:48 PM  End time: 1/12/2021 4:10 PM  Reason for block: labor epidural  Staffing  Performed: resident/CRNA   Anesthesiologist: Bettie Lomeli DO  Resident/CRNA: FRANCISCO Landers CRNA  Preanesthetic Checklist  Completed: patient identified, IV checked, site marked, risks and benefits discussed, surgical consent, monitors and equipment checked, pre-op evaluation, timeout performed, anesthesia consent given, oxygen available and patient being monitored  Epidural  Patient position: sitting  Prep: ChloraPrep  Patient monitoring: cardiac monitor, continuous pulse ox and frequent blood pressure checks  Approach: midline  Location: lumbar (1-5)  Injection technique: LYNN air  Guidance: paresthesia technique  Provider prep: mask and sterile gloves  Needle  Needle type: Tuohy   Needle gauge: 18 G  Needle length: 3.5 in  Needle insertion depth: 7 cm  Catheter type: side hole  Catheter at skin depth: 11 cm  Test dose: negative  Assessment  Hemodynamics: stable  Attempts: 2  Additional Notes  Patient jerking rapidly during first insertion attempt. Aborted attempt. Administered additional local anesthetic. Discussed with patient the importance of remaining still during insertion. Successful placement on second attempt 1 level down from first attempt.

## 2021-01-12 NOTE — FLOWSHEET NOTE
Patient arrived to unit with complaint that her water broke around 5458, she states she is not yet feeling contractions and baby is moving as usual, patient states this is her 3rd baby and she has been 1 cm in the office this past week. Patient shown to  for voiding and changing into gown.

## 2021-01-13 PROCEDURE — 6370000000 HC RX 637 (ALT 250 FOR IP): Performed by: OBSTETRICS & GYNECOLOGY

## 2021-01-13 PROCEDURE — 1200000000 HC SEMI PRIVATE

## 2021-01-13 RX ORDER — ACETAMINOPHEN 325 MG/1
650 TABLET ORAL EVERY 4 HOURS PRN
Status: DISCONTINUED | OUTPATIENT
Start: 2021-01-13 | End: 2021-01-14 | Stop reason: HOSPADM

## 2021-01-13 RX ORDER — DOCUSATE SODIUM 100 MG/1
100 CAPSULE, LIQUID FILLED ORAL 2 TIMES DAILY
Status: DISCONTINUED | OUTPATIENT
Start: 2021-01-13 | End: 2021-01-14 | Stop reason: HOSPADM

## 2021-01-13 RX ORDER — ONDANSETRON 8 MG/1
8 TABLET, ORALLY DISINTEGRATING ORAL EVERY 8 HOURS PRN
Status: DISCONTINUED | OUTPATIENT
Start: 2021-01-13 | End: 2021-01-14 | Stop reason: HOSPADM

## 2021-01-13 RX ORDER — METHYLERGONOVINE MALEATE 0.2 MG/ML
200 INJECTION INTRAVENOUS
Status: ACTIVE | OUTPATIENT
Start: 2021-01-13 | End: 2021-01-13

## 2021-01-13 RX ORDER — MISOPROSTOL 200 UG/1
800 TABLET ORAL PRN
Status: DISCONTINUED | OUTPATIENT
Start: 2021-01-13 | End: 2021-01-14 | Stop reason: HOSPADM

## 2021-01-13 RX ORDER — SODIUM CHLORIDE, SODIUM LACTATE, POTASSIUM CHLORIDE, CALCIUM CHLORIDE 600; 310; 30; 20 MG/100ML; MG/100ML; MG/100ML; MG/100ML
INJECTION, SOLUTION INTRAVENOUS CONTINUOUS
Status: DISCONTINUED | OUTPATIENT
Start: 2021-01-13 | End: 2021-01-14 | Stop reason: HOSPADM

## 2021-01-13 RX ORDER — HYDROCODONE BITARTRATE AND ACETAMINOPHEN 5; 325 MG/1; MG/1
1 TABLET ORAL EVERY 6 HOURS PRN
Status: DISCONTINUED | OUTPATIENT
Start: 2021-01-13 | End: 2021-01-14 | Stop reason: HOSPADM

## 2021-01-13 RX ORDER — IBUPROFEN 800 MG/1
800 TABLET ORAL EVERY 8 HOURS PRN
Status: DISCONTINUED | OUTPATIENT
Start: 2021-01-13 | End: 2021-01-14 | Stop reason: HOSPADM

## 2021-01-13 RX ORDER — SODIUM CHLORIDE 0.9 % (FLUSH) 0.9 %
10 SYRINGE (ML) INJECTION EVERY 12 HOURS SCHEDULED
Status: DISCONTINUED | OUTPATIENT
Start: 2021-01-13 | End: 2021-01-14

## 2021-01-13 RX ORDER — MODIFIED LANOLIN
OINTMENT (GRAM) TOPICAL PRN
Status: DISCONTINUED | OUTPATIENT
Start: 2021-01-13 | End: 2021-01-14 | Stop reason: HOSPADM

## 2021-01-13 RX ORDER — SODIUM CHLORIDE 0.9 % (FLUSH) 0.9 %
10 SYRINGE (ML) INJECTION PRN
Status: DISCONTINUED | OUTPATIENT
Start: 2021-01-13 | End: 2021-01-14

## 2021-01-13 RX ADMIN — ACETAMINOPHEN 650 MG: 325 TABLET ORAL at 09:30

## 2021-01-13 RX ADMIN — IBUPROFEN 800 MG: 800 TABLET, FILM COATED ORAL at 07:43

## 2021-01-13 RX ADMIN — IBUPROFEN 800 MG: 800 TABLET, FILM COATED ORAL at 23:11

## 2021-01-13 RX ADMIN — ACETAMINOPHEN 650 MG: 325 TABLET ORAL at 13:15

## 2021-01-13 RX ADMIN — DOCUSATE SODIUM 100 MG: 100 CAPSULE, LIQUID FILLED ORAL at 07:42

## 2021-01-13 RX ADMIN — ACETAMINOPHEN 650 MG: 325 TABLET ORAL at 03:31

## 2021-01-13 RX ADMIN — IBUPROFEN 800 MG: 800 TABLET, FILM COATED ORAL at 15:37

## 2021-01-13 RX ADMIN — HYDROCODONE BITARTRATE AND ACETAMINOPHEN 1 TABLET: 5; 325 TABLET ORAL at 18:36

## 2021-01-13 ASSESSMENT — PAIN SCALES - GENERAL
PAINLEVEL_OUTOF10: 0
PAINLEVEL_OUTOF10: 4

## 2021-01-13 NOTE — PROGRESS NOTES
Department of Obstetrics and Gynecology  Labor and Delivery  Post Partum Day #1      SUBJECTIVE:  Patient feeling well with no complaints. Bottlefeeding without difficulty. Mild lochia. Patient denies pain. Urination without difficulty. No flatus or bowel movement yet. OBJECTIVE:/61   Pulse 80   Temp 97.4 °F (36.3 °C) (Oral)   Resp 17   Ht 5' 3\" (1.6 m)   Wt 180 lb (81.6 kg)   SpO2 100%   Breastfeeding Unknown   BMI 31.89 kg/m²    Extremities: warm and dry. Trace edema    DATA:    Lab Results   Component Value Date    HGB 12.3 01/12/2021    HCT 36.8 01/12/2021       ASSESSMENT & PLAN:    Normal post partum day 1 exam. Plan as per orders. Pt not feeling ready to go home yet tomorrow. Will re-assess tomorrow.        Hernán Wong

## 2021-01-13 NOTE — PLAN OF CARE
Problem: Discharge Planning:  Goal: Discharged to appropriate level of care  Description: Discharged to appropriate level of care  1/13/2021 1117 by Anais Justin RN  Outcome: Ongoing     Problem: Constipation:  Goal: Bowel elimination is within specified parameters  Description: Bowel elimination is within specified parameters  1/13/2021 1117 by Anais Justin RN  Outcome: Ongoing     Problem: Fluid Volume - Imbalance:  Goal: Absence of postpartum hemorrhage signs and symptoms  Description: Absence of postpartum hemorrhage signs and symptoms  1/13/2021 1117 by Anais Justin RN  Outcome: Ongoing     Problem: Infection - Risk of, Puerperal Infection:  Goal: Will show no infection signs and symptoms  Description: Will show no infection signs and symptoms  1/13/2021 1117 by Anais Justin RN  Outcome: Ongoing     Problem: Mood - Altered:  Goal: Mood stable  Description: Mood stable  1/13/2021 1117 by Anais Justin RN  Outcome: Ongoing     Problem: Pain - Acute:  Goal: Pain level will decrease  Description: Pain level will decrease  1/13/2021 1117 by Anais Justin RN  Outcome: Ongoing     Problem: Discharge Planning:  Goal: Discharged to appropriate level of care  Description: Discharged to appropriate level of care  1/13/2021 0123 by Carol Robertson RN  Outcome: Completed     Problem:  Body Temperature -  Risk of, Imbalanced  Goal: Ability to maintain a body temperature in the normal range will improve to within specified parameters  Description: Ability to maintain a body temperature in the normal range will improve to within specified parameters  1/13/2021 0123 by Carol Robertson RN  Outcome: Completed

## 2021-01-13 NOTE — PROGRESS NOTES
Pt to 5A14 via wheelchair holding infant in arms. Spouse at bedside with belongings. Report received from [facility]. VS obtained and pt assessed. Orientation to room provided, as well as policies and procedures. DTV x2, instructed to call for staff assist for next two voids. Voices understanding.

## 2021-01-13 NOTE — FLOWSHEET NOTE
Report given to Greta CABEZAS at bedside. Patient transported to [phone] via wheelchair in stable condition,  in arms. Spouse at side with belongings.

## 2021-01-13 NOTE — PROGRESS NOTES
Pt to bathroom with staff assist to void large amount. Kun care performed with tucks and kun bottle. Scant amount of bleeding noted with no clots. Ambulated back to bed with steady gait. U/U firm and midlines. IV removed at this time, pt tolerated well.

## 2021-01-13 NOTE — PLAN OF CARE
Ugo Crow RN  Outcome: Ongoing  Note: Patient has goetz catheter in place, draining clear yellow urine      Problem: Discharge Planning:  Goal: Discharged to appropriate level of care  Description: Discharged to appropriate level of care  1/12/2021 1936 by Andrew Man RN  Outcome: Ongoing  Note: Patient aware of 2 hour recovery period on L&D, then transfer to mom/baby unit for remainder of stay      Problem: Pain:  Goal: Pain level will decrease  Description: Pain level will decrease  1/12/2021 1936 by Andrew Man RN  Outcome: Ongoing  Note: Patient is comfortable with epidural, pain goal 3/10     Problem: Falls - Risk of:  Goal: Will remain free from falls  Description: Will remain free from falls  1/12/2021 1936 by Andrew Man RN  Outcome: Ongoing  Note: Aware of needing to remain in bed due to epidural, bed locked in lowest position, call light within reach     Care plan reviewed with patient and spouse. Patient and spouse verbalize understanding of the plan of care and contribute to goal setting.

## 2021-01-13 NOTE — PROGRESS NOTES
Pt to bathroom with staff assist to void large amount. Kun care performed with kun bottle, dermoplast, and tucks. Scant amount of bleeding noted with no clots visualized. Ambulated back to bed with steady gait. U/U firm and midline. Instructed to call for assist with one more void. Voices understanding.

## 2021-01-13 NOTE — FLOWSHEET NOTE
Called into moms room due to her having severe abdominal pain on her right side . Mom pinpointed the pain . Comfort given to mom and lights turned down . Heated pad placed on abdomen . Motrin 800 mg given to mom for pain scale of 9 . Reassured mom to rest and will recheck . fob at moms bedside.

## 2021-01-13 NOTE — ANESTHESIA POSTPROCEDURE EVALUATION
Department of Anesthesiology  Postprocedure Note    Patient: Tali Denson  MRN: [id number]  YOB: 1995  Date of evaluation: 1/13/2021  Time:  3:09 PM     Procedure Summary     Date: 01/12/21 Room / Location:     Anesthesia Start: 1548 Anesthesia Stop: 2119    Procedure: Labor Analgesia Diagnosis:     Scheduled Providers:  Responsible Provider: Darin Mascorro DO    Anesthesia Type: epidural ASA Status: 2          Anesthesia Type: epidural    Rocky Phase I: Rocky Score: 9    Rocky Phase II: Rocky Score: 10    Last vitals: Reviewed and per EMR flowsheets.        Anesthesia Post Evaluation    Patient location during evaluation: bedside  Patient participation: complete - patient participated  Level of consciousness: awake  Pain score: 0  Airway patency: patent  Nausea & Vomiting: no nausea and no vomiting  Complications: no  Cardiovascular status: blood pressure returned to baseline and hemodynamically stable  Respiratory status: acceptable and room air  Hydration status: euvolemic

## 2021-01-13 NOTE — FLOWSHEET NOTE
Patient denies urge to void or attempt to void. Dorota care provided for the patient in bed. Pads and gown changed. Patient tolerated well.

## 2021-01-13 NOTE — H&P
Obstetric Admission Note        CHIEF COMPLAINT: for delivery    HISTORY OF PRESENT ILLNESS:                     The patient is a 30 y.o.  female @ 39+4 weeks who presents with SROM @ 0400 clear. Irreg ctxs initially but improved throughout the day. Good FM. Comfortable with epidural. Uncomplicated pregnancy      Past Medical History:        Diagnosis Date    Abnormal Pap smear of cervix     cleared        Medications Prior to Admission:   Medications Prior to Admission: Prenatal MV-Min-Fe Fum-FA-DHA (PRENATAL 1 PO), Take 1 tablet by mouth  acetaminophen (TYLENOL) 325 MG tablet, Take 2 tablets by mouth every 6 hours as needed for Pain or Fever    Allergies:  Patient has no known allergies. DATA:    Cervical exam complete +2 on my arrival  FHT's cat 1    ASSESSMENT AND PLAN:      Assessment problems  IUP @ 39+4wks  Active labor    Plan:  - Admit to L&D  - Anticipate Vaginal Delivery  Mata BROWN

## 2021-01-13 NOTE — FLOWSHEET NOTE
Reassessed mom and she is sleeping quietly . Mom states the motrin has helped and pain is starting to subside somewhat.

## 2021-01-13 NOTE — PROGRESS NOTES
Pt  called stating patient was in severe pain. Was on the floor to do baby's circumcision and nurse had given the patient a heating pad and motrin. After talking with the pt about her pain and location/onset, I suspect she may be passing a kidney stone. Encouraged oral hydration, will consider IVF if needed. Will add Norco order for additional pain relief. If no significant improvement, will order imaging.

## 2021-01-13 NOTE — FLOWSHEET NOTE
Dr Stephanie Carrillo in to see mom . Dr. Keri Mcginnis maybe kidney stone and encouraged mom to push water . Mom states her pain is a 4 now and is tolerable. Close observation will continue.

## 2021-01-13 NOTE — FLOWSHEET NOTE
Discussed pitocin order to start it of Dr. Linden Man and states does not want pitocin at this time. Says to talk with .

## 2021-01-13 NOTE — FLOWSHEET NOTE
Dr Mario Mcelroy returned page to unit, updated on last VE, contractions and FHTs. Orders received to increase Pitocin per normal labor protocol.

## 2021-01-13 NOTE — L&D DELIVERY NOTE
Department of Obstetrics and Gynecology  Spontaneous Vaginal Delivery Note      Pt Name: Gisell Zepeda  MRN: [id number] Keerthi Villalobos #: [de-identified]  YOB: 1995  Procedure Performed By: Fela Pang D.O.        Pre-operative Diagnosis:  Term pregnancy, Spontaneous labor, Single fetus and Uncomplicated pregnancy    Post-operative Diagnosis: Same, delivered. Procedure:  Spontaneous vaginal delivery or Repair first degree spontaneous laceration    Surgeon:  Jone Sales DO, D.O. Information for the patient's :  Princess Hills [[id number]]          Anesthesia:  epidural anesthesia    Estimated blood loss:  250ml    Specimen:  Placenta not sent to pathology     Complications:  none    Condition:  infant stable to general nursery and mother stable    Details of Procedure: The patient is a 30 y.o. female at 43w3d   OB History        3    Para   2    Term   2       0    AB   0    Living   2       SAB   0    TAB   0    Ectopic   0    Molar        Multiple   1    Live Births                 who was admitted for active phase labor. She received the following interventions: IV Pitocin augmentation. The patient progressed well,did receive an epidural, became complete and started to push. Patient progressed well and the fetal head was delivered over an intact perineum, nuchal cord x1 was noted and easily reduced, and the rest of the infant delivered atraumatically. Infant was placed on mother abdomen. Nose and mouth suctioned with bulb suction. Cord was clamped and cut. The delivery of the placenta was spontaneous and noted intact. The perineum and vagina were explored and a first degree laceration was repaired in standard fashion with 3-0 vicryl. Sponge, instruments, and needle counts were correct. Needles were disposed of appropriately.       Delivery Summary:  Labor & Delivery Summary  Dilation Complete Date: 21  Dilation Complete Time:     Jone Sales D.O.        Mother's Information    Labor Events     labor?: No  Rupture type: Spontaneous=SROM  Fluid color: Clear, Bloody Show, Other (Comment)  Fluid odor: None     Mother Delivery Information    Episiotomy: None  Lacerations: 1st  # of Repair Packets: 1  Surgical or Additional Est. Blood Loss (mL): 0 (View Only): Edit in Flowsheets   Combined Est. Blood Loss (mL): 0        [place], Baby Boy Binu Faith [[id number]]    Labor Events     labor?: No   steroids?: None  Cervical ripening date/time:     Antibiotics received during labor?: No  Rupture Identifier: Sac 1   Rupture date/time: 21 04:15:00   Rupture type: Spontaneous=SROM  Fluid color: Clear  Fluid odor: None  Induction: None  Augmentation: Oxytocin  Indications for augmentation: Ineffective Contraction Pattern   Additional complications:  OB: DELIVERY - COMPLICATIONS   Nuchal cord, single gestation         Labor Event Times    Labor onset date/time: 21   Dilation complete date/time:  21 EST   Start pushin2021   Decision time (emergent ):        Anesthesia    Method: Epidural     Assisted Delivery Details    Forceps attempted?: No  Vacuum extractor attempted?: No     Document Additional Attempt       Document Additional Attempt             Shoulder Dystocia    Shoulder dystocia present?: No  Add Second Maneuver  Add Third Maneuver  Add Fourth Maneuver  Add Fifth Maneuver  Add Sixth Maneuver  Add Seventh Maneuver  Add Eighth Maneuver  Add Ninth Maneuver      Presentation    Presentation: Vertex  _: Occiput  _: Anterior      Information    Head delivery date/time: 2021 21:19:00   Changing the 's delivery date/time could affect patient care.:    Delivery date/time:  21   Delivery type: Vaginal, Spontaneous    Details:        Delivery Providers    Delivering clinician: Mimi sEpinosa DO   Provider Role    FRANCISCO Lai - KATHERYN Nurse Anesthetist    Harleen Hernandez RCP Respiratory Therapist (Night)    Tammy Go RN Registered Nurse    Ignacio Hairston RN Delivery Nurse      Cord    Vessels: 3 Vessels  Complications: Nuchal  Nuchal intervention: clamped and cut  Nuchal cord description: loose nuchal cord  Cord around: head  Number of loops: 1  Delayed cord clamping?: Yes     Placenta    Date/time: 2021 21:25:00  Removal: Spontaneous  Appearance: Intact  Disposition: Refrigerator     Delivery Resuscitation    Method: Stimulation     Apgars    Living status: Living  Apgars   1 Minute:  5 Minute:  10 Minute 15 Minute 20 Minute   Skin Color: 0  1       Heart Rate: 2  2       Reflex Irritability: 2  2       Muscle Tone: 2  2       Respiratory Effort: 2  2       Total: 8  9               Apgars Assigned By: Willi Cardoso RN     Skin to Skin    Skin to skin initiation date/time: 21 21:30:00   Skin to skin with: Mother  Skin to skin end date/time:     Reason skin to skin not initiated: Patient Refused     [place] Measurements    Weight: 3245 g Length: 49.5 cm   Head circumference: 35.6 cm Chest circumference: 34.3 cm      Delivery Information    Episiotomy: None  Perineal lacerations: 1st Repaired: Yes   Vaginal laceration: No    Cervical laceration: No    Surgical or additional est. blood loss (mL): 0 (View Only): Edit in Flowsheets   Combined est. blood loss (mL): 0     Vaginal Delivery Counts    Initial count personnel: 16 INSTUMENTS AND 1 SHARP AND 10 SPONGES BY MONO CABEZAS   4x4:  Needles:  Instruments:  Lap Pads:  Sponges:    Initial counts:         Final counts:         Final count personnel: Marquita LORENZ  Final count verified by: 16 INSTRUMENTS,1+1NEEDLE ADDED=2,10 SPONGES  Accurate final count?: Yes  Final vaginal sweep completed:  Yes

## 2021-01-13 NOTE — FLOWSHEET NOTE
One norco given for pain as stated per patient . Mom resting with heating pad . Encouraged mom to notify us if norco doesn't work .

## 2021-01-14 ENCOUNTER — APPOINTMENT (OUTPATIENT)
Dept: GENERAL RADIOLOGY | Age: 26
DRG: 560 | End: 2021-01-14
Payer: COMMERCIAL

## 2021-01-14 VITALS
OXYGEN SATURATION: 100 % | HEIGHT: 63 IN | SYSTOLIC BLOOD PRESSURE: 122 MMHG | HEART RATE: 91 BPM | RESPIRATION RATE: 18 BRPM | WEIGHT: 180 LBS | TEMPERATURE: 98.8 F | DIASTOLIC BLOOD PRESSURE: 73 MMHG | BODY MASS INDEX: 31.89 KG/M2

## 2021-01-14 PROCEDURE — 6370000000 HC RX 637 (ALT 250 FOR IP): Performed by: OBSTETRICS & GYNECOLOGY

## 2021-01-14 PROCEDURE — 6370000000 HC RX 637 (ALT 250 FOR IP): Performed by: NURSE PRACTITIONER

## 2021-01-14 PROCEDURE — 74018 RADEX ABDOMEN 1 VIEW: CPT

## 2021-01-14 RX ORDER — SODIUM PHOSPHATE, DIBASIC AND SODIUM PHOSPHATE, MONOBASIC 7; 19 G/133ML; G/133ML
1 ENEMA RECTAL
Status: DISCONTINUED | OUTPATIENT
Start: 2021-01-14 | End: 2021-01-14 | Stop reason: HOSPADM

## 2021-01-14 RX ADMIN — DOCUSATE SODIUM 100 MG: 100 CAPSULE, LIQUID FILLED ORAL at 08:12

## 2021-01-14 RX ADMIN — IBUPROFEN 800 MG: 800 TABLET, FILM COATED ORAL at 16:39

## 2021-01-14 RX ADMIN — HYDROCODONE BITARTRATE AND ACETAMINOPHEN 1 TABLET: 5; 325 TABLET ORAL at 00:36

## 2021-01-14 RX ADMIN — MAGNESIUM HYDROXIDE 30 ML: 2400 SUSPENSION ORAL at 13:58

## 2021-01-14 RX ADMIN — HYDROCODONE BITARTRATE AND ACETAMINOPHEN 1 TABLET: 5; 325 TABLET ORAL at 08:14

## 2021-01-14 RX ADMIN — IBUPROFEN 800 MG: 800 TABLET, FILM COATED ORAL at 08:12

## 2021-01-14 ASSESSMENT — PAIN SCALES - GENERAL
PAINLEVEL_OUTOF10: 5
PAINLEVEL_OUTOF10: 0
PAINLEVEL_OUTOF10: 4

## 2021-01-14 ASSESSMENT — PAIN DESCRIPTION - PAIN TYPE: TYPE: ACUTE PAIN

## 2021-01-14 NOTE — PLAN OF CARE
Problem: Discharge Planning:  Goal: Discharged to appropriate level of care  Description: Discharged to appropriate level of care  1/14/2021 1211 by Romy Lopez RN  Outcome: Ongoing     Problem: Constipation:  Goal: Bowel elimination is within specified parameters  Description: Bowel elimination is within specified parameters  1/14/2021 1211 by Romy Lopez RN  Outcome: Ongoing     Problem: Fluid Volume - Imbalance:  Goal: Absence of postpartum hemorrhage signs and symptoms  Description: Absence of postpartum hemorrhage signs and symptoms  1/14/2021 1211 by Romy Lopez RN  Outcome: Ongoing     Problem: Infection - Risk of, Puerperal Infection:  Goal: Will show no infection signs and symptoms  Description: Will show no infection signs and symptoms  1/14/2021 1211 by Romy Lopez RN  Outcome: Ongoing     Problem: Mood - Altered:  Goal: Mood stable  Description: Mood stable  1/14/2021 1211 by Romy Lopez RN  Outcome: Ongoing     Problem: Pain - Acute:  Goal: Pain level will decrease  Description: Pain level will decrease  1/14/2021 1211 by Romy Lopez RN  Outcome: Ongoing   Plan of care reviewed

## 2021-01-14 NOTE — PLAN OF CARE
Problem: Discharge Planning:  Goal: Discharged to appropriate level of care  Description: Discharged to appropriate level of care  1/14/2021 0113 by Cheng Delgadillo RN  Outcome: Ongoing  Note: Dalia Anne in a  row      Problem: Constipation:  Goal: Bowel elimination is within specified parameters  Description: Bowel elimination is within specified parameters  1/14/2021 0113 by Cheng Delgadillo RN  Outcome: Ongoing  Note: Passing gas      Problem: Fluid Volume - Imbalance:  Goal: Absence of postpartum hemorrhage signs and symptoms  Description: Absence of postpartum hemorrhage signs and symptoms  1/14/2021 0113 by Cheng Delgadillo RN  Outcome: Ongoing  Note: Lochia WNL      Problem: Infection - Risk of, Puerperal Infection:  Goal: Will show no infection signs and symptoms  Description: Will show no infection signs and symptoms  1/14/2021 0113 by Cheng Delgadillo RN  Outcome: Ongoing  Note: No signs of infection      Problem: Mood - Altered:  Goal: Mood stable  Description: Mood stable  1/14/2021 0113 by Cheng Delgadillo RN  Outcome: Ongoing  Note: Pt pleasant      Problem: Pain - Acute:  Goal: Pain level will decrease  Description: Pain level will decrease  1/14/2021 0113 by Cheng Delgadillo RN  Outcome: Ongoing  Note: Pain controlled with po meds. Discussed ice for perineal pain and/or incisional pain or the use of warm blanket/heating pad for uterine cramps. Pt states her pain goal 4/10 has been met. Plan of care discussed with mother and she contributes to goal setting and voices understanding of plan of care.

## 2021-01-14 NOTE — PROGRESS NOTES
Department of Obstetrics and Gynecology  Labor and Delivery  Post Partum Day #2      SUBJECTIVE:  Patient not feeling well, continues to have pain in the RLQ and then it can radiate across the abdomen. Pain prior to norco was 9/10, after medication she states it is 3-4/10. Pain comes and goes. Mild lower back discomfort. Bottlefeeding without difficulty. Urination without difficulty. Mild lochia. Positive flatus and no bowel movement. OBJECTIVE:/65   Pulse 63   Temp 97.8 °F (36.6 °C) (Oral)   Resp 16   Ht 5' 3\" (1.6 m)   Wt 180 lb (81.6 kg)   SpO2 100%   Breastfeeding Unknown   BMI 31.89 kg/m²    ABDOMEN:  Soft, tender, guarding, fundus firm. GENITAL/URINARY:  Normal post partum findings. Extremities: warm and dry. No edema. No CVA tenderness    DATA:    Lab Results   Component Value Date    HGB 12.3 01/12/2021    HCT 36.8 01/12/2021       ASSESSMENT & PLAN:    Normal post partum day 2 exam. Plan as per orders. Will obtain abdominal xray for pain. Consider discharge if xray negative.        Electronically signed by FRANCISCO Johnson CNP on 1/14/2021 at 10:41 AM

## 2021-01-14 NOTE — FLOWSHEET NOTE
Postpartum education brochure given, teaching complete. [place] postpartum depression screening discussed with patient. [facility] and contact her healthcare provider if her score is > 10. Patient voiced understanding. Reviewed postpartum birth warning signs flyer with patient. Patient has voiced understanding of teaching. Mother's blood type is A+. Baby's blood type is NA. Mother did not receive Rhogam. Discharge teaching and instructions for a vaginal delivery completed with patient using teachback method. AVS reviewed. Patient voiced understanding regarding follow up appointments, and care of self at home.

## 2021-01-14 NOTE — PROGRESS NOTES
KUB shows large amount fecal matter in colon, constipation. Called and spoke to Karthikeyan Pandey RN. Will order Milk of Magnesia and put in discharge for later tonight. If pain continues, call office.

## 2021-01-14 NOTE — DISCHARGE SUMMARY
Obstetric Discharge Summary      Pt Name: Coretta Fairbanks  MRN: [id number] Tiffanie #: [de-identified]  YOB: 1995        Admitting Diagnosis  IUP  OB History        3    Para   3    Term   3       0    AB   0    Living   3       SAB   0    TAB   0    Ectopic   0    Molar        Multiple   0    Live Births   1                Reasons for Admission on 2021  4:53 AM  Spontaneous vaginal delivery [O80]        Postpartum/Operative Complications        Data  Information for the patient's :  Riley Horse Boy Kenisha Martinez [[id number]]   male   Birth Weight: 7 lb 2.5 oz (3.245 kg)       Discharge Diagnosis  Postpartum, Vaginal Delivery    Discharge Information  Current Discharge Medication List      CONTINUE these medications which have NOT CHANGED    Details   Prenatal MV-Min-Fe Fum-FA-DHA (PRENATAL 1 PO) Take 1 tablet by mouth      acetaminophen (TYLENOL) 325 MG tablet Take 2 tablets by mouth every 6 hours as needed for Pain or Fever  Qty: 120 tablet, Refills: 3                 Vaginal Delivery  Diet regular    Condition: Stable  Discharge to:  home  Follow up in 4 weeks    Patient to be discharged on 21    Electronically signed by FRANCISCO Arnold CNP on 2021 at 1:08 PM

## 2021-09-21 ENCOUNTER — HOSPITAL ENCOUNTER (EMERGENCY)
Age: 26
Discharge: HOME | End: 2021-09-21
Payer: COMMERCIAL

## 2021-09-21 VITALS
HEIGHT: 63 IN | HEART RATE: 80 BPM | DIASTOLIC BLOOD PRESSURE: 78 MMHG | TEMPERATURE: 97.2 F | WEIGHT: 163 LBS | OXYGEN SATURATION: 98 % | SYSTOLIC BLOOD PRESSURE: 120 MMHG | RESPIRATION RATE: 16 BRPM | BODY MASS INDEX: 28.88 KG/M2

## 2021-09-21 DIAGNOSIS — K04.7 DENTAL ABSCESS: Primary | ICD-10-CM

## 2021-09-21 PROCEDURE — 99213 OFFICE O/P EST LOW 20 MIN: CPT

## 2021-09-21 PROCEDURE — 99202 OFFICE O/P NEW SF 15 MIN: CPT | Performed by: NURSE PRACTITIONER

## 2021-09-21 RX ORDER — AMOXICILLIN AND CLAVULANATE POTASSIUM 875; 125 MG/1; MG/1
1 TABLET, FILM COATED ORAL 2 TIMES DAILY WITH MEALS
Qty: 20 TABLET | Refills: 0 | Status: SHIPPED | OUTPATIENT
Start: 2021-09-21 | End: 2021-10-01

## 2021-09-21 RX ORDER — NAPROXEN 500 MG/1
500 TABLET ORAL 2 TIMES DAILY WITH MEALS
Qty: 20 TABLET | Refills: 0 | OUTPATIENT
Start: 2021-09-21 | End: 2021-10-16

## 2021-09-21 ASSESSMENT — PAIN SCALES - GENERAL: PAINLEVEL_OUTOF10: 7

## 2021-09-21 ASSESSMENT — PAIN DESCRIPTION - ORIENTATION: ORIENTATION: LOWER;LEFT

## 2021-09-21 ASSESSMENT — PAIN DESCRIPTION - PAIN TYPE: TYPE: ACUTE PAIN

## 2021-09-21 ASSESSMENT — PAIN DESCRIPTION - DESCRIPTORS: DESCRIPTORS: ACHING

## 2021-09-21 ASSESSMENT — PAIN DESCRIPTION - FREQUENCY: FREQUENCY: INTERMITTENT

## 2021-09-21 ASSESSMENT — PAIN DESCRIPTION - LOCATION: LOCATION: MOUTH

## 2021-09-21 NOTE — ED PROVIDER NOTES
[address] Trey       Chief Complaint   Patient presents with    Oral Swelling     lower left gum sore       Nurses Notes reviewed and I agree except as noted in the HPI. HISTORY OF PRESENT ILLNESS   Yeny Solomon is a 27 y.o. female who presents with dental pain on the left lower side. She is concerned that her wisdom teeth are giving her problems. REVIEW OF SYSTEMS     Review of Systems   Constitutional: Negative for chills, fatigue and fever. HENT: Positive for dental problem and facial swelling. PAST MEDICAL HISTORY         Diagnosis Date    Abnormal Pap smear of cervix     cleared        SURGICAL HISTORY     Patient  has no past surgical history on file. CURRENT MEDICATIONS       Discharge Medication List as of 9/21/2021  5:59 PM      CONTINUE these medications which have NOT CHANGED    Details   Prenatal MV-Min-Fe Fum-FA-DHA (PRENATAL 1 PO) Take 1 tablet by mouthHistorical Med             ALLERGIES     Patient is has No Known Allergies. FAMILY HISTORY     Patient'sfamily history includes No Known Problems in her father and mother. SOCIAL HISTORY     Patient  reports that she has never smoked. She has never used smokeless tobacco. She reports that she does not drink alcohol and does not use drugs. PHYSICAL EXAM     ED TRIAGE VITALS  BP: 120/78, Temp: 97.2 °F (36.2 °C), Pulse: 80, Resp: 16, SpO2: 98 %  Physical Exam  Vitals and nursing note reviewed. Constitutional:       General: She is not in acute distress. Appearance: Normal appearance. She is well-developed and well-groomed. HENT:      Head: Normocephalic and atraumatic. Right Ear: External ear normal.      Left Ear: External ear normal.      Mouth/Throat:      Lips: Pink. Mouth: Mucous membranes are moist.      Pharynx: Pharyngeal swelling (left lower wisdom tooth area) present.    Eyes:      Conjunctiva/sclera: Conjunctivae normal.      Right eye: Right conjunctiva is not injected. Left eye: Left conjunctiva is not injected. Pupils: Pupils are equal.   Cardiovascular:      Rate and Rhythm: Normal rate. Heart sounds: Normal heart sounds. Pulmonary:      Effort: Pulmonary effort is normal. No respiratory distress. Musculoskeletal:      Cervical back: Normal range of motion. Skin:     General: Skin is warm and dry. Neurological:      Mental Status: She is alert and oriented to person, place, and time. Psychiatric:         Mood and Affect: Mood normal.         Speech: Speech normal.         Behavior: Behavior is cooperative. DIAGNOSTIC RESULTS   Labs:  Abnormal Labs Reviewed - No data to display     IMAGING:  No orders to display     URGENT CARE COURSE:     Vitals:    09/21/21 1751   BP: 120/78   Pulse: 80   Resp: 16   Temp: 97.2 °F (36.2 °C)   TempSrc: Temporal   SpO2: 98%   Weight: 163 lb (73.9 kg)   Height: 5' 3\" (1.6 m)       Medications - No data to display  PROCEDURES:  FINALIMPRESSION      1. Dental abscess        DISPOSITION/PLAN   DISPOSITION      Discharge   Physical assessment findings, diagnostic testing(s) if applicable, and vital signs reviewed with patient/patient representative. If applicable, patient/patient representative will be contacted upon receipt of final culture and sensitivity or other testing results when available. Any additions or changes to medications or changes the plan of care will be made at that time. Differential diagnosis(s) discussed with patient/patient representative. Patient is to follow-up with family care provider in 2-3 days if no improvement. Patient is to go to the emergency department if symptoms change/worsen. Patient/patient representative is aware of care plan, questions answered, verbalizes understanding and is in agreement. Printed instructions attached to after visit summary.            Problem List Items Addressed This Visit     None      Visit Diagnoses     Dental abscess    -  Primary    Relevant Medications    amoxicillin-clavulanate (AUGMENTIN) 875-125 MG per tablet    naproxen (NAPROSYN) 500 MG tablet          PATIENT REFERRED TO:  Roel Barraza MD  [address]   [address]  [phone]    Schedule an appointment as soon as possible for a visit in 3 days  For further evaluation. , If symptoms change/worsen, go to the [address] [place] [address] Urgent Care  [facility] [facility]  [phone]    as needed, If symptoms change/worsen, go to the [address] [facility], [address] Venkat Sampson B, APRN - CNP  09/23/21 1201

## 2021-09-21 NOTE — ED TRIAGE NOTES
Pt ambulatory into esuc with c/o swelling and pain to gum on lower left side of mouth where her wisdom teeth are for the past four days.  Pt states pain 7 and does not have a dentist.

## 2021-09-23 ASSESSMENT — ENCOUNTER SYMPTOMS: FACIAL SWELLING: 1

## 2021-10-16 ENCOUNTER — HOSPITAL ENCOUNTER (EMERGENCY)
Age: 26
Discharge: HOME | End: 2021-10-16
Payer: COMMERCIAL

## 2021-10-16 VITALS
SYSTOLIC BLOOD PRESSURE: 116 MMHG | WEIGHT: 160 LBS | RESPIRATION RATE: 12 BRPM | HEIGHT: 63 IN | DIASTOLIC BLOOD PRESSURE: 70 MMHG | BODY MASS INDEX: 28.35 KG/M2 | TEMPERATURE: 97.9 F | HEART RATE: 73 BPM | OXYGEN SATURATION: 97 %

## 2021-10-16 DIAGNOSIS — K08.89 DENTALGIA: Primary | ICD-10-CM

## 2021-10-16 PROCEDURE — 99213 OFFICE O/P EST LOW 20 MIN: CPT

## 2021-10-16 PROCEDURE — 99213 OFFICE O/P EST LOW 20 MIN: CPT | Performed by: NURSE PRACTITIONER

## 2021-10-16 RX ORDER — BUSPIRONE HYDROCHLORIDE 10 MG/1
10 TABLET ORAL 3 TIMES DAILY
COMMUNITY

## 2021-10-16 RX ORDER — CHLORHEXIDINE GLUCONATE 0.12 MG/ML
15 RINSE ORAL 2 TIMES DAILY
Qty: 420 ML | Refills: 0 | Status: SHIPPED | OUTPATIENT
Start: 2021-10-16 | End: 2021-10-30

## 2021-10-16 RX ORDER — IBUPROFEN 400 MG/1
400 TABLET ORAL EVERY 8 HOURS PRN
Qty: 40 TABLET | Refills: 0 | Status: SHIPPED | OUTPATIENT
Start: 2021-10-16

## 2021-10-16 ASSESSMENT — ENCOUNTER SYMPTOMS
COUGH: 0
CHEST TIGHTNESS: 0
TRISMUS: 0
SHORTNESS OF BREATH: 0
WHEEZING: 0
CHOKING: 0
FACIAL SWELLING: 0
APNEA: 0
STRIDOR: 0

## 2021-10-16 ASSESSMENT — PAIN - FUNCTIONAL ASSESSMENT: PAIN_FUNCTIONAL_ASSESSMENT: PREVENTS OR INTERFERES SOME ACTIVE ACTIVITIES AND ADLS

## 2021-10-16 ASSESSMENT — PAIN DESCRIPTION - PAIN TYPE: TYPE: ACUTE PAIN

## 2021-10-16 ASSESSMENT — PAIN DESCRIPTION - DESCRIPTORS: DESCRIPTORS: DISCOMFORT

## 2021-10-16 ASSESSMENT — PAIN DESCRIPTION - ORIENTATION: ORIENTATION: LOWER;RIGHT

## 2021-10-16 ASSESSMENT — PAIN SCALES - GENERAL: PAINLEVEL_OUTOF10: 2

## 2021-10-16 ASSESSMENT — PAIN DESCRIPTION - LOCATION: LOCATION: MOUTH

## 2021-10-16 NOTE — ED PROVIDER NOTES
[place] [id number]  Urgent Care Encounter      CHIEF COMPLAINT       Chief Complaint   Patient presents with    Dental Pain       Nurses Notes reviewed and I agree except as noted in the HPI. HISTORY OFPRESENT ILLNESS   Francheska Grider is a 27 y.o. The history is provided by the patient. No  was used. Dental Pain  Location:  Lower  Lower teeth location:  32/RL 3rd molar  Quality:  Aching  Severity:  Severe  Onset quality:  Sudden  Timing:  Intermittent  Progression:  Waxing and waning  Chronicity:  New  Context: malocclusion    Context: not abscess, cap still on, not crown fracture, not dental caries, not dental fracture, not enamel fracture, filling intact, not intrusion, normal dentition, not recent dental surgery and not trauma    Relieved by:  Nothing  Worsened by:  Cold food/drink, hot food/drink, touching, jaw movement and pressure  Ineffective treatments:  None tried  Associated symptoms: no congestion, no difficulty swallowing, no drooling, no facial pain, no facial swelling, no fever, no gum swelling, no headaches, no neck pain, no neck swelling, no oral bleeding, no oral lesions and no trismus    Risk factors: no alcohol problem, no cancer, no chewing tobacco use, no diabetes, no immunosuppression, sufficient dental care, no periodontal disease and no smoking        REVIEW OF SYSTEMS     Review of Systems   Constitutional: Positive for appetite change. Negative for activity change, chills, diaphoresis, fatigue and fever. HENT: Positive for dental problem. Negative for congestion, drooling, facial swelling and mouth sores. Respiratory: Negative for apnea, cough, choking, chest tightness, shortness of breath, wheezing and stridor. Cardiovascular: Negative for chest pain, palpitations and leg swelling. Musculoskeletal: Negative for neck pain. Neurological: Negative for dizziness, light-headedness and headaches.        PAST MEDICAL HISTORY         Diagnosis Date    Abnormal Pap smear of cervix     cleared        SURGICAL HISTORY     Patient  has no past surgical history on file. CURRENT MEDICATIONS       Discharge Medication List as of 10/16/2021  2:47 PM      CONTINUE these medications which have NOT CHANGED    Details   busPIRone (BUSPAR) 10 MG tablet Take 10 mg by mouth 3 times dailyHistorical Med      Prenatal MV-Min-Fe Fum-FA-DHA (PRENATAL 1 PO) Take 1 tablet by mouthHistorical Med             ALLERGIES     Patient is has No Known Allergies. FAMILY HISTORY     Patient's family history includes No Known Problems in her father and mother. SOCIAL HISTORY     Patient  reports that she has never smoked. She has never used smokeless tobacco. She reports that she does not drink alcohol and does not use drugs. PHYSICAL EXAM     ED TRIAGE VITALS  BP: 116/70, Temp: 97.9 °F (36.6 °C), Pulse: 73, Resp: 12, SpO2: 97 %  Physical Exam  Vitals and nursing note reviewed. Constitutional:       General: She is not in acute distress. Appearance: Normal appearance. She is normal weight. She is not ill-appearing, toxic-appearing or diaphoretic. HENT:      Head: Atraumatic. Right Ear: External ear normal.      Left Ear: External ear normal.      Mouth/Throat:      Lips: Pink. Mouth: Mucous membranes are moist.      Dentition: Abnormal dentition. Does not have dentures. Dental tenderness and gingival swelling present. No dental caries, dental abscesses or gum lesions. Tongue: No lesions. Tongue does not deviate from midline. Palate: No mass and lesions. Pharynx: Oropharynx is clear. Uvula midline. No pharyngeal swelling, oropharyngeal exudate, posterior oropharyngeal erythema or uvula swelling. Tonsils: No tonsillar exudate or tonsillar abscesses. Eyes:      Extraocular Movements: Extraocular movements intact.       Conjunctiva/sclera: Conjunctivae normal.   Pulmonary:      Effort: Pulmonary effort is normal.   Musculoskeletal: General: Normal range of motion. Cervical back: Normal range of motion. Skin:     General: Skin is warm. Neurological:      General: No focal deficit present. Mental Status: She is alert. Psychiatric:         Mood and Affect: Mood normal.         Behavior: Behavior normal.         Thought Content: Thought content normal.         Judgment: Judgment normal.         DIAGNOSTIC RESULTS   Labs:No results found for this visit on 10/16/21. IMAGING:  No orders to display     URGENT CARE COURSE:     Vitals:    10/16/21 1417   BP: 116/70   Pulse: 73   Resp: 12   Temp: 97.9 °F (36.6 °C)   TempSrc: Temporal   SpO2: 97%   Weight: 160 lb (72.6 kg)   Height: 5' 3\" (1.6 m)       Medications - No data to display  PROCEDURES:  None  FINAL IMPRESSION      1. Dentalgia        DISPOSITION/PLAN   Decision To Discharge         The patient was also advised to eat a soft diet, chew on the opposite side of pain, Use a soft bristle toothbrush and warm saltwater lavage after eating. The patient was advised to take the medication as directed. The patient was also advised to follow-up with a local dentist ASAP. We also discussed returning to the Emergency Department immediately if new or worsening symptoms occur. We have discussed the symptoms which are most concerning that necessitate immediate return.           PATIENT REFERRED TO:  Melodie Mejia, [facility]  [facility] [facility]  [phone]    Call   As needed    DISCHARGE MEDICATIONS:  Discharge Medication List as of 10/16/2021  2:47 PM      START taking these medications    Details   ibuprofen (IBU) 400 MG tablet Take 1 tablet by mouth every 8 hours as needed for Pain, Disp-40 tablet, R-0Normal      chlorhexidine (PERIDEX) 0.12 % solution Take 15 mLs by mouth 2 times daily for 14 days, Disp-420 mL, R-0Normal           Discharge Medication List as of 10/16/2021  2:47 PM          Piero Lanza, APRN - AZIZA Harry Cos, FRANCISCO - CNP  10/16/21 1531

## 2021-10-16 NOTE — ED TRIAGE NOTES
Patient ambulated to room with complaint of pain and swelling around wisdom tooth on right lower. States she has an appointment for removal in 2 weeks.

## 2022-01-15 ENCOUNTER — HOSPITAL ENCOUNTER (EMERGENCY)
Age: 27
Discharge: HOME | End: 2022-01-15
Payer: COMMERCIAL

## 2022-01-15 VITALS
BODY MASS INDEX: 29.48 KG/M2 | OXYGEN SATURATION: 99 % | SYSTOLIC BLOOD PRESSURE: 120 MMHG | WEIGHT: 166.4 LBS | RESPIRATION RATE: 16 BRPM | TEMPERATURE: 98 F | DIASTOLIC BLOOD PRESSURE: 72 MMHG | HEART RATE: 80 BPM

## 2022-01-15 DIAGNOSIS — J02.9 ACUTE PHARYNGITIS, UNSPECIFIED ETIOLOGY: Primary | ICD-10-CM

## 2022-01-15 LAB
GROUP A STREP CULTURE, REFLEX: NEGATIVE
REFLEX THROAT C + S: NORMAL

## 2022-01-15 PROCEDURE — 87070 CULTURE OTHR SPECIMN AEROBIC: CPT

## 2022-01-15 PROCEDURE — 99213 OFFICE O/P EST LOW 20 MIN: CPT | Performed by: NURSE PRACTITIONER

## 2022-01-15 PROCEDURE — 87880 STREP A ASSAY W/OPTIC: CPT

## 2022-01-15 PROCEDURE — 99213 OFFICE O/P EST LOW 20 MIN: CPT

## 2022-01-15 ASSESSMENT — PAIN DESCRIPTION - LOCATION: LOCATION: THROAT

## 2022-01-15 ASSESSMENT — ENCOUNTER SYMPTOMS
COUGH: 0
VOMITING: 0
SINUS PRESSURE: 0
DIARRHEA: 0
RHINORRHEA: 0
SHORTNESS OF BREATH: 0
BACK PAIN: 0
SINUS CONGESTION: 0
NAUSEA: 0
TROUBLE SWALLOWING: 0
SORE THROAT: 1

## 2022-01-15 ASSESSMENT — PAIN SCALES - GENERAL: PAINLEVEL_OUTOF10: 5

## 2022-01-15 NOTE — ED PROVIDER NOTES
Diagnosis Date    Abnormal Pap smear of cervix     cleared     Anxiety        SURGICALHISTORY     Patient  has no past surgical history on file. CURRENT MEDICATIONS       Discharge Medication List as of 1/15/2022  6:40 PM      CONTINUE these medications which have NOT CHANGED    Details   busPIRone (BUSPAR) 10 MG tablet Take 10 mg by mouth 3 times dailyHistorical Med      ibuprofen (IBU) 400 MG tablet Take 1 tablet by mouth every 8 hours as needed for Pain, Disp-40 tablet, R-0Normal      Prenatal MV-Min-Fe Fum-FA-DHA (PRENATAL 1 PO) Take 1 tablet by mouthHistorical Med             ALLERGIES     Patient is has No Known Allergies. Patients There is no immunization history for the selected administration types on file for this patient. FAMILY HISTORY     Patient's family history includes No Known Problems in her father and mother. SOCIAL HISTORY     Patient  reports that she has never smoked. She has never used smokeless tobacco. She reports that she does not drink alcohol and does not use drugs. PHYSICAL EXAM     ED TRIAGE VITALS  BP: 120/72, Temp: 98 °F (36.7 °C), Pulse: 80, Resp: 16, SpO2: 99 %,Estimated body mass index is 29.48 kg/m² as calculated from the following:    Height as of 10/16/21: 5' 3\" (1.6 m). Weight as of this encounter: 166 lb 6.4 oz (75.5 kg). ,Patient's last menstrual period was 01/02/2022. Physical Exam  Vitals and nursing note reviewed. Constitutional:       General: She is not in acute distress. Appearance: Normal appearance. She is well-developed and well-groomed. She is not ill-appearing, toxic-appearing or diaphoretic. HENT:      Head: Normocephalic. Right Ear: Hearing, tympanic membrane, ear canal and external ear normal. No drainage, swelling or tenderness. No mastoid tenderness. Tympanic membrane is not erythematous. Left Ear: Hearing, tympanic membrane, ear canal and external ear normal. No drainage, swelling or tenderness.  No mastoid tenderness. Tympanic membrane is not erythematous. Nose: Nose normal. No congestion or rhinorrhea. Right Sinus: No maxillary sinus tenderness or frontal sinus tenderness. Left Sinus: No maxillary sinus tenderness or frontal sinus tenderness. Mouth/Throat:      Lips: Pink. Mouth: Mucous membranes are moist.      Pharynx: Uvula midline. Posterior oropharyngeal erythema present. No uvula swelling. Tonsils: Tonsillar exudate present. No tonsillar abscesses. 1+ on the right. 1+ on the left. Eyes:      Conjunctiva/sclera: Conjunctivae normal.      Pupils: Pupils are equal, round, and reactive to light. Cardiovascular:      Rate and Rhythm: Normal rate and regular rhythm. Heart sounds: Normal heart sounds. Pulmonary:      Effort: Pulmonary effort is normal. No accessory muscle usage. Breath sounds: Normal breath sounds. No decreased breath sounds, wheezing, rhonchi or rales. Chest:   Breasts:      Right: No supraclavicular adenopathy. Left: No supraclavicular adenopathy. Abdominal:      General: Bowel sounds are normal.      Palpations: Abdomen is soft. Tenderness: There is no abdominal tenderness. There is no right CVA tenderness, left CVA tenderness or guarding. Negative signs include Curry's sign. Musculoskeletal:      Cervical back: Full passive range of motion without pain and normal range of motion. No rigidity. No muscular tenderness. Lymphadenopathy:      Head:      Right side of head: No submental, submandibular, tonsillar, preauricular, posterior auricular or occipital adenopathy. Left side of head: No submental, submandibular, tonsillar, preauricular, posterior auricular or occipital adenopathy. Cervical: No cervical adenopathy. Right cervical: No superficial, deep or posterior cervical adenopathy. Left cervical: No superficial, deep or posterior cervical adenopathy.       Upper Body:      Right upper body: No supraclavicular adenopathy. Left upper body: No supraclavicular adenopathy. Skin:     General: Skin is warm and dry. Capillary Refill: Capillary refill takes less than 2 seconds. Findings: No rash. Neurological:      Mental Status: She is alert and oriented to person, place, and time. Psychiatric:         Mood and Affect: Mood normal.         Behavior: Behavior normal. Behavior is cooperative. DIAGNOSTIC RESULTS     Labs:  Results for orders placed or performed during the hospital encounter of 01/15/22   Strep A culture, throat   Result Value Ref Range    REFLEX THROAT C + S INDICATED    STREP A ANTIGEN   Result Value Ref Range    GROUP A STREP CULTURE, REFLEX Negative        IMAGING:    No orders to display         EKG:      URGENT CARE COURSE:     Vitals:    01/15/22 1809   BP: 120/72   Pulse: 80   Resp: 16   Temp: 98 °F (36.7 °C)   SpO2: 99%   Weight: 166 lb 6.4 oz (75.5 kg)       Medications - No data to display         PROCEDURES:  None    FINAL IMPRESSION      1. Acute pharyngitis, unspecified etiology          DISPOSITION/ PLAN       The patient was advised to take medication as directed. The patient was also advised to drink lots of fluids, monitor urine output for hydration status or dark colored urine. The patient could take Motrin or Tylenol for comfort, pain and fever. The Patient/Patient representative was advised to monitor for any changes such as fever not relieved with Motrin or Tylenol. Also monitor for any difficulty swallowing, neck pain or stiffness, increase in swollen glands, the development of rash or any other concerns they are to dial 911 or go to the emergency department for reevaluation and further management. If the patient does not experience any of the above symptoms now to follow-up with her primary care provider for reevaluation in 3-5 days.   The patient/Patient representative are agreeable to the treatment plan at this time the patient is not in acute distress and the patient left in stable condition.      PATIENT REFERRED TO:  Aleshia Del Angel MD  [address] [facility] / [facility] [address]      DISCHARGE MEDICATIONS:  Discharge Medication List as of 1/15/2022  6:40 PM          Discharge Medication List as of 1/15/2022  6:40 PM      STOP taking these medications       naproxen (NAPROSYN) 500 MG tablet Comments:   Reason for Stopping:         acetaminophen (TYLENOL) 325 MG tablet Comments:   Reason for Stopping:               Discharge Medication List as of 1/15/2022  6:40 PM          FRANCISCO Saenz CNP    (Please note that portions of this note were completed with a voice recognition program. Efforts were made to edit the dictations but occasionally words are mis-transcribed.)           FRANCISCO Saenz CNP  01/15/22 5274

## 2022-01-17 LAB — THROAT/NOSE CULTURE: NORMAL

## 2023-03-23 ENCOUNTER — APPOINTMENT (OUTPATIENT)
Dept: GENERAL RADIOLOGY | Age: 28
End: 2023-03-23
Payer: COMMERCIAL

## 2023-03-23 ENCOUNTER — HOSPITAL ENCOUNTER (EMERGENCY)
Age: 28
Discharge: HOME | End: 2023-03-23
Attending: EMERGENCY MEDICINE
Payer: COMMERCIAL

## 2023-03-23 VITALS
SYSTOLIC BLOOD PRESSURE: 109 MMHG | DIASTOLIC BLOOD PRESSURE: 51 MMHG | HEART RATE: 102 BPM | TEMPERATURE: 98.7 F | RESPIRATION RATE: 16 BRPM | BODY MASS INDEX: 26.58 KG/M2 | WEIGHT: 150 LBS | HEIGHT: 63 IN | OXYGEN SATURATION: 98 %

## 2023-03-23 DIAGNOSIS — J02.0 ACUTE STREPTOCOCCAL PHARYNGITIS: Primary | ICD-10-CM

## 2023-03-23 LAB
EKG ATRIAL RATE: 109 BPM
EKG P AXIS: 42 DEGREES
EKG P-R INTERVAL: 156 MS
EKG Q-T INTERVAL: 316 MS
EKG QRS DURATION: 102 MS
EKG QTC CALCULATION (BAZETT): 425 MS
EKG R AXIS: 41 DEGREES
EKG T AXIS: 29 DEGREES
EKG VENTRICULAR RATE: 109 BPM
FLUAV RNA RESP QL NAA+PROBE: NOT DETECTED
FLUBV RNA RESP QL NAA+PROBE: NOT DETECTED
S PYO AG THROAT QL: POSITIVE
S PYO THROAT QL CULT: NORMAL
SARS-COV-2 RNA RESP QL NAA+PROBE: NOT DETECTED

## 2023-03-23 PROCEDURE — 6360000002 HC RX W HCPCS: Performed by: EMERGENCY MEDICINE

## 2023-03-23 PROCEDURE — 2580000003 HC RX 258: Performed by: EMERGENCY MEDICINE

## 2023-03-23 PROCEDURE — 93010 ELECTROCARDIOGRAM REPORT: CPT | Performed by: INTERNAL MEDICINE

## 2023-03-23 PROCEDURE — 93005 ELECTROCARDIOGRAM TRACING: CPT | Performed by: EMERGENCY MEDICINE

## 2023-03-23 PROCEDURE — 87636 SARSCOV2 & INF A&B AMP PRB: CPT

## 2023-03-23 PROCEDURE — 96374 THER/PROPH/DIAG INJ IV PUSH: CPT

## 2023-03-23 PROCEDURE — 6370000000 HC RX 637 (ALT 250 FOR IP): Performed by: EMERGENCY MEDICINE

## 2023-03-23 PROCEDURE — 99285 EMERGENCY DEPT VISIT HI MDM: CPT

## 2023-03-23 PROCEDURE — 87880 STREP A ASSAY W/OPTIC: CPT

## 2023-03-23 PROCEDURE — 71045 X-RAY EXAM CHEST 1 VIEW: CPT

## 2023-03-23 RX ORDER — ACETAMINOPHEN 500 MG
1000 TABLET ORAL ONCE
Status: COMPLETED | OUTPATIENT
Start: 2023-03-23 | End: 2023-03-23

## 2023-03-23 RX ORDER — AMOXICILLIN 250 MG/5ML
1000 POWDER, FOR SUSPENSION ORAL ONCE
Status: DISCONTINUED | OUTPATIENT
Start: 2023-03-23 | End: 2023-03-23

## 2023-03-23 RX ORDER — 0.9 % SODIUM CHLORIDE 0.9 %
500 INTRAVENOUS SOLUTION INTRAVENOUS ONCE
Status: DISCONTINUED | OUTPATIENT
Start: 2023-03-23 | End: 2023-03-23 | Stop reason: HOSPADM

## 2023-03-23 RX ORDER — AMOXICILLIN 500 MG/1
500 CAPSULE ORAL 2 TIMES DAILY
Qty: 10 CAPSULE | Refills: 0 | Status: SHIPPED | OUTPATIENT
Start: 2023-03-23 | End: 2023-03-28

## 2023-03-23 RX ORDER — 0.9 % SODIUM CHLORIDE 0.9 %
500 INTRAVENOUS SOLUTION INTRAVENOUS ONCE
Status: COMPLETED | OUTPATIENT
Start: 2023-03-23 | End: 2023-03-23

## 2023-03-23 RX ORDER — KETOROLAC TROMETHAMINE 30 MG/ML
15 INJECTION, SOLUTION INTRAMUSCULAR; INTRAVENOUS ONCE
Status: COMPLETED | OUTPATIENT
Start: 2023-03-23 | End: 2023-03-23

## 2023-03-23 RX ORDER — LIDOCAINE HYDROCHLORIDE 20 MG/ML
15 SOLUTION OROPHARYNGEAL ONCE
Status: DISCONTINUED | OUTPATIENT
Start: 2023-03-23 | End: 2023-03-23

## 2023-03-23 RX ORDER — ONDANSETRON 4 MG/1
4 TABLET, ORALLY DISINTEGRATING ORAL ONCE
Status: COMPLETED | OUTPATIENT
Start: 2023-03-23 | End: 2023-03-23

## 2023-03-23 RX ORDER — ONDANSETRON 4 MG/1
4 TABLET, FILM COATED ORAL 3 TIMES DAILY PRN
Qty: 15 TABLET | Refills: 0 | Status: SHIPPED | OUTPATIENT
Start: 2023-03-23

## 2023-03-23 RX ADMIN — KETOROLAC TROMETHAMINE 15 MG: 30 INJECTION, SOLUTION INTRAMUSCULAR at 14:29

## 2023-03-23 RX ADMIN — Medication 5 ML: at 14:29

## 2023-03-23 RX ADMIN — ACETAMINOPHEN 1000 MG: 500 TABLET ORAL at 13:50

## 2023-03-23 RX ADMIN — SODIUM CHLORIDE 500 ML: 9 INJECTION, SOLUTION INTRAVENOUS at 13:49

## 2023-03-23 RX ADMIN — ONDANSETRON 4 MG: 4 TABLET, ORALLY DISINTEGRATING ORAL at 14:29

## 2023-03-23 ASSESSMENT — PAIN - FUNCTIONAL ASSESSMENT: PAIN_FUNCTIONAL_ASSESSMENT: 0-10

## 2023-03-23 ASSESSMENT — PAIN DESCRIPTION - LOCATION: LOCATION: ABDOMEN

## 2023-03-23 ASSESSMENT — PAIN SCALES - GENERAL: PAINLEVEL_OUTOF10: 5

## 2023-03-23 NOTE — ED TRIAGE NOTES
Pt presents to the ED via EMS from PCP via EMS with complaints of nausea and abdominal pain. At PCP and was given IM phenergan, and patient states nausea is better. Pt states PCP did not do any testing. Pt respirations are even and unlabored. Per EMS report, patietn BP was 945 systolic and was given 003 mL of NS en route to the ED.

## 2023-03-23 NOTE — Clinical Note
Cheo Amador accompanied William Bermeo to the emergency department on 3/23/2023. They may return to work on 03/24/2023. If you have any questions or concerns, please don't hesitate to call.       Nahun Palmer MD

## 2023-03-23 NOTE — ED PROVIDER NOTES
Pain, Disp: 40 tablet, Rfl: 0    Prenatal MV-Min-Fe Fum-FA-DHA (PRENATAL 1 PO), Take 1 tablet by mouth, Disp: , Rfl:       SOCIAL HISTORY     Social History     Social History Narrative    Not on file     Social History     Tobacco Use    Smoking status: Never    Smokeless tobacco: Never   Vaping Use    Vaping Use: Never used   Substance Use Topics    Alcohol use: No    Drug use: No         ALLERGIES   No Known Allergies      FAMILY HISTORY     Family History   Problem Relation Age of Onset    No Known Problems Mother     No Known Problems Father          PHYSICAL EXAM     ED Triage Vitals [03/23/23 1313]   BP Temp Temp Source Heart Rate Resp SpO2 Height Weight   118/69 (!) 102.2 °F (39 °C) Oral (!) 106 18 98 % 5' 3\" (1.6 m) 150 lb (68 kg)         Additional Vital Signs:  Vitals:    03/23/23 1429   BP: (!) 109/51   Pulse: (!) 112   Resp: 16   Temp:    SpO2: 98%       Physical Exam  HENT:      Mouth/Throat:      Mouth: Mucous membranes are moist. No lacerations, oral lesions or angioedema. Dentition: Does not have dentures. No dental tenderness, dental caries or dental abscesses. Tongue: No lesions. Tongue does not deviate from midline. Palate: No mass and lesions. Pharynx: Uvula midline. Pharyngeal swelling and posterior oropharyngeal erythema present. No oropharyngeal exudate or uvula swelling. Tonsils: No tonsillar exudate. 2+ on the right. 2+ on the left. DIFFERENTIALS   Initial Assessment: Given the patient's above chief complaint and findings on history and physical examination, I thought it was appropriate to consider the following emergency medical conditions:    Group A strep versus COVID versus flu, versus other viral upper respiratory infection. Although some of these diagnoses are unlikely they were considered in my medical decision making.       Chronic Conditions considered:   Patient Active Problem List   Diagnosis    Fever and chills    SIRS (systemic inflammatory strep.  Group A strep swab positive, COVID and flu negative. Oropharynx erythematous, swollen tonsils on exam.  Airway intact. Patient was able to swallow Tylenol for fever. She was given fluid bolus. Zofran for nausea. Patient was prescribed amoxicillin and Zofran. Will follow-up with her primary care doctor. MEDICATION CHANGES     DISCHARGE MEDICATIONS:  New Prescriptions    AMOXICILLIN (AMOXIL) 500 MG CAPSULE    Take 1 capsule by mouth 2 times daily for 5 days    ONDANSETRON (ZOFRAN) 4 MG TABLET    Take 1 tablet by mouth 3 times daily as needed for Nausea or Vomiting            FINAL DISPOSITION     Final diagnoses:   Acute streptococcal pharyngitis     Condition: condition: stable  Dispo: Discharge to home    PATIENT REFERRED TO:  No follow-up provider specified. The results of pertinent diagnostic studies and exam findings were discussed. The patients provisional diagnosis and plan of care were discussed with the patient who expressed understanding. Strict verbal and written return precautions, instructions and appropriate follow-up provided to  the patient    Critical Care Time   CRITICAL CARE:  None    PROCEDURES: (None if blank)  Procedures: This transcription was electronically signed. Parts of this transcriptions may have been dictated by use of voice recognition software and electronically transcribed, and parts may have been transcribed with the assistance of an ED scribe. The transcription may contain errors not detected in proofreading.     Electronically Signed: Citlalli Yoo MD, 03/23/23, 2:33 PM        Citlalli Yoo MD  Resident  03/23/23 6806

## 2023-03-23 NOTE — ED PROVIDER NOTES
[address]    Pt Name: Caryle Fairy  MRN: [id number]  [place] 1995  Date of evaluation: 9/12/20      I personally saw and examined the patient. I have reviewed and agree with the Resident findings, including all diagnostic interpretations and treatment plans as written. I was present for the key portion of any procedures performed and the inclusive time noted in any critical care statement. History: This patient was seen with Inna Gusman, resident physician. This is a 25-year-old female complaining mainly of a sore throat. She has symmetrically enlarged tonsils bilaterally and a small amount of exudate. She tested positive for strep. Was slightly tachycardic.   Started on Amoxil    Diagnosis: Strep pharyngitis  Discharged              Maura Potter DO  03/23/23 0697

## 2023-03-23 NOTE — ED NOTES
Pt reports being around someone positive with strep. Swabs obtained.  Will monitor      Justice Arreaga RN  03/23/23 6207

## 2023-08-09 ENCOUNTER — HOSPITAL ENCOUNTER (OUTPATIENT)
Dept: GENERAL RADIOLOGY | Age: 28
Discharge: HOME | End: 2023-08-09
Payer: COMMERCIAL

## 2023-08-09 ENCOUNTER — HOSPITAL ENCOUNTER (OUTPATIENT)
Age: 28
Discharge: HOME | End: 2023-08-09
Payer: COMMERCIAL

## 2023-08-09 DIAGNOSIS — R52 PAIN: ICD-10-CM

## 2023-08-09 PROCEDURE — 72100 X-RAY EXAM L-S SPINE 2/3 VWS: CPT

## 2023-08-09 PROCEDURE — 74022 RADEX COMPL AQT ABD SERIES: CPT

## 2024-01-06 ENCOUNTER — HOSPITAL ENCOUNTER (EMERGENCY)
Age: 29
Discharge: HOME | End: 2024-01-06
Payer: COMMERCIAL

## 2024-01-06 VITALS
OXYGEN SATURATION: 100 % | WEIGHT: 155 LBS | DIASTOLIC BLOOD PRESSURE: 73 MMHG | BODY MASS INDEX: 27.46 KG/M2 | SYSTOLIC BLOOD PRESSURE: 118 MMHG | HEART RATE: 86 BPM | RESPIRATION RATE: 16 BRPM | TEMPERATURE: 98.2 F

## 2024-01-06 DIAGNOSIS — J02.9 VIRAL PHARYNGITIS: Primary | ICD-10-CM

## 2024-01-06 LAB
S PYO AG THROAT QL: NEGATIVE
SARS-COV-2 RDRP RESP QL NAA+PROBE: NOT  DETECTED

## 2024-01-06 PROCEDURE — 99213 OFFICE O/P EST LOW 20 MIN: CPT | Performed by: NURSE PRACTITIONER

## 2024-01-06 PROCEDURE — 99213 OFFICE O/P EST LOW 20 MIN: CPT

## 2024-01-06 PROCEDURE — 87651 STREP A DNA AMP PROBE: CPT

## 2024-01-06 PROCEDURE — 87635 SARS-COV-2 COVID-19 AMP PRB: CPT

## 2024-01-06 ASSESSMENT — ENCOUNTER SYMPTOMS
TROUBLE SWALLOWING: 0
SORE THROAT: 1
EYE REDNESS: 0
COUGH: 0
EYE DISCHARGE: 0
DIARRHEA: 0
RHINORRHEA: 0
SHORTNESS OF BREATH: 0
NAUSEA: 0
VOMITING: 0

## 2024-01-06 ASSESSMENT — PAIN - FUNCTIONAL ASSESSMENT
PAIN_FUNCTIONAL_ASSESSMENT: PREVENTS OR INTERFERES SOME ACTIVE ACTIVITIES AND ADLS
PAIN_FUNCTIONAL_ASSESSMENT: 0-10

## 2024-01-06 ASSESSMENT — PAIN DESCRIPTION - PAIN TYPE: TYPE: ACUTE PAIN

## 2024-01-06 ASSESSMENT — PAIN DESCRIPTION - LOCATION: LOCATION: THROAT

## 2024-01-06 ASSESSMENT — PAIN DESCRIPTION - FREQUENCY: FREQUENCY: CONTINUOUS

## 2024-01-06 ASSESSMENT — PAIN DESCRIPTION - ONSET: ONSET: PROGRESSIVE

## 2024-01-06 ASSESSMENT — PAIN DESCRIPTION - DESCRIPTORS: DESCRIPTORS: ACHING

## 2024-01-06 ASSESSMENT — PAIN SCALES - GENERAL: PAINLEVEL_OUTOF10: 7

## 2024-01-06 NOTE — DISCHARGE INSTRUCTIONS
Gargle with salt water 2-3 times daily and use Chloraseptic sore throat spray as directed on package.  You  may use Tylenol and Motrin as directed on package.  Throw away toothbrush after sore throat resolves.  Seek emergency medical treatment for fever >101.5 for 3 days, unable to eat or urinate for 6 hours, increase in current symptoms or for new or worrisome symptoms.

## 2024-01-06 NOTE — ED PROVIDER NOTES
Take 1 tablet by mouth 3 times daily      ibuprofen (IBU) 400 MG tablet Take 1 tablet by mouth every 8 hours as needed for Pain  Qty: 40 tablet, Refills: 0      Prenatal MV-Min-Fe Fum-FA-DHA (PRENATAL 1 PO) Take 1 tablet by mouth             ALLERGIES     Patient is has No Known Allergies.    FAMILY HISTORY     Patient'sfamily history includes No Known Problems in her father and mother.    SOCIAL HISTORY     Patient  reports that she has never smoked. She has never used smokeless tobacco. She reports that she does not drink alcohol and does not use drugs.    PHYSICAL EXAM     ED TRIAGE VITALS  BP: 118/73, Temp: 98.2 °F (36.8 °C), Pulse: 86, Respirations: 16, SpO2: 100 %  Physical Exam  Vitals and nursing note reviewed.   Constitutional:       General: She is not in acute distress.     Appearance: Normal appearance. She is normal weight.   HENT:      Head: Normocephalic and atraumatic.      Right Ear: Tympanic membrane normal.      Left Ear: Tympanic membrane normal.      Nose: Nose normal.      Mouth/Throat:      Mouth: Mucous membranes are moist.      Pharynx: Oropharynx is clear.   Eyes:      Conjunctiva/sclera: Conjunctivae normal.   Cardiovascular:      Rate and Rhythm: Normal rate and regular rhythm.      Heart sounds: Normal heart sounds.   Pulmonary:      Effort: Pulmonary effort is normal. No respiratory distress.      Breath sounds: Normal breath sounds. No wheezing.   Abdominal:      General: Abdomen is flat. Bowel sounds are normal.      Palpations: Abdomen is soft.   Musculoskeletal:         General: Normal range of motion.      Cervical back: Normal range of motion and neck supple.   Lymphadenopathy:      Cervical: No cervical adenopathy.   Skin:     General: Skin is warm and dry.      Capillary Refill: Capillary refill takes less than 2 seconds.   Neurological:      General: No focal deficit present.      Mental Status: She is alert and oriented to person, place, and time.   Psychiatric:         Mood  and Affect: Mood normal.         Behavior: Behavior normal.         Thought Content: Thought content normal.         Judgment: Judgment normal.         DIAGNOSTIC RESULTS   Labs:  Results for orders placed or performed during the hospital encounter of 01/06/24   Strep Screen Group A Throat   Result Value Ref Range    Rapid Strep A Screen NEGATIVE    COVID-19, Rapid   Result Value Ref Range    SARS-CoV-2, PADMA NOT  DETECTED NOT DETECTED       IMAGING:  No orders to display      URGENT CARE COURSE:     Vitals:    01/06/24 1657   BP: 118/73   Pulse: 86   Resp: 16   Temp: 98.2 °F (36.8 °C)   TempSrc: Temporal   SpO2: 100%   Weight: 70.3 kg (155 lb)       Medications - No data to display  PROCEDURES:  None  FINAL IMPRESSION      1. Viral pharyngitis        DISPOSITION/PLAN   DISPOSITION Decision To Discharge 01/06/2024 05:23:17 PM    The results of pertinent diagnostic studies and exam findings were discussed with patient/patient representative.   Shared decision-making was performed and patient/patient representative are agreeable that they are suitable for discharge at this time.  The patient’s provisional diagnosis and plan of care were discussed with the patient/patient representative who expressed understanding.  The patient/representative is given strict written and verbal instructions about care at home, including over-the-counter management, prescription information, follow-up, and signs and symptoms of worsening of condition, and the patient/patient representative did verbalize understanding of these instructions.  Patient will go to nearest emergency department if symptoms change or worsen, or for any sign or symptom deemed emergent by the patient or family members. Follow up as an outpatient with PCP within the next 3 days, or sooner if symptoms warrant.     PATIENT REFERRED TO:  Michael Duncan MD  [address] [place] [address] [address]  [facility] [address]  [phone]    In 1 week  If symptoms

## 2024-07-05 ENCOUNTER — HOSPITAL ENCOUNTER (EMERGENCY)
Age: 29
Discharge: HOME | End: 2024-07-05
Payer: COMMERCIAL

## 2024-07-05 VITALS
WEIGHT: 160 LBS | HEIGHT: 63 IN | OXYGEN SATURATION: 100 % | TEMPERATURE: 98.8 F | HEART RATE: 75 BPM | BODY MASS INDEX: 28.35 KG/M2 | RESPIRATION RATE: 16 BRPM

## 2024-07-05 DIAGNOSIS — J02.9 ACUTE PHARYNGITIS, UNSPECIFIED ETIOLOGY: Primary | ICD-10-CM

## 2024-07-05 LAB — S PYO AG THROAT QL: NEGATIVE

## 2024-07-05 PROCEDURE — 87651 STREP A DNA AMP PROBE: CPT

## 2024-07-05 PROCEDURE — 99213 OFFICE O/P EST LOW 20 MIN: CPT | Performed by: NURSE PRACTITIONER

## 2024-07-05 PROCEDURE — 99213 OFFICE O/P EST LOW 20 MIN: CPT

## 2024-07-05 RX ORDER — CETIRIZINE HYDROCHLORIDE, PSEUDOEPHEDRINE HYDROCHLORIDE 5; 120 MG/1; MG/1
1 TABLET, FILM COATED, EXTENDED RELEASE ORAL 2 TIMES DAILY
Qty: 14 TABLET | Refills: 0 | Status: SHIPPED | OUTPATIENT
Start: 2024-07-05 | End: 2024-07-12

## 2024-07-05 ASSESSMENT — ENCOUNTER SYMPTOMS
WHEEZING: 0
RHINORRHEA: 0
SHORTNESS OF BREATH: 0
SORE THROAT: 1
TROUBLE SWALLOWING: 0
CHOKING: 0
STRIDOR: 0
VOICE CHANGE: 0
APNEA: 0
CHEST TIGHTNESS: 0
SINUS CONGESTION: 0
COUGH: 0
ABDOMINAL PAIN: 0
EYE DISCHARGE: 0

## 2024-07-05 ASSESSMENT — PAIN - FUNCTIONAL ASSESSMENT: PAIN_FUNCTIONAL_ASSESSMENT: NONE - DENIES PAIN

## 2024-07-05 NOTE — ED PROVIDER NOTES
General: No focal deficit present.      Mental Status: She is alert and oriented to person, place, and time.   Psychiatric:         Mood and Affect: Mood normal.         Behavior: Behavior normal.         Thought Content: Thought content normal.         Judgment: Judgment normal.         DIAGNOSTIC RESULTS   Labs:  Results for orders placed or performed during the hospital encounter of 07/05/24   Strep Screen Group A Throat   Result Value Ref Range    Rapid Strep A Screen NEGATIVE        IMAGING:  No orders to display     URGENT CARE COURSE:     Vitals:    07/05/24 1229   Pulse: 75   Resp: 16   Temp: 98.8 °F (37.1 °C)   TempSrc: Oral   SpO2: 100%   Weight: 72.6 kg (160 lb)   Height: 1.6 m (5' 3\")       Medications - No data to display  PROCEDURES:  None  FINAL IMPRESSION      1. Acute pharyngitis, unspecified etiology        DISPOSITION/PLAN   Decision To Discharge     The patient was advised to drink plenty of fluids.  They may take Tylenol for fever or body aches.  Take prescribed medication as directed.  They may also take OTC antihistamines/ decongestant as needed.    Pt is advised to go to ER if symptoms worsen, new symptoms develop, high fever >102, vomiting, breathing difficulty, lethargy, chest pain or shortness of breath Dial 911. The patient or patient's representative is agreeable to the treatment plan they're advised to follow-up with her primary care provider in one week for reevaluation.    REFERRED TO:  Michael Duncan MD  [address] [place] [address] [address]  [facility] [address]  [phone]    Schedule an appointment as soon as possible for a visit       DISCHARGE MEDICATIONS:  Discharge Medication List as of 7/5/2024 12:49 PM        START taking these medications    Details   cetirizine-psuedoephedrine (ZYRTEC-D) 5-120 MG per extended release tablet Take 1 tablet by mouth 2 times daily for 7 days, Disp-14 tablet, R-0Normal           Discharge Medication List as of 7/5/2024 12:49 PM

## 2024-11-06 ENCOUNTER — HOSPITAL ENCOUNTER (OUTPATIENT)
Age: 29
Discharge: HOME | End: 2024-11-06
Payer: COMMERCIAL

## 2024-11-06 LAB
BASOPHILS ABSOLUTE: 0.1 THOU/MM3 (ref 0–0.1)
BASOPHILS NFR BLD AUTO: 0.6 %
DEPRECATED RDW RBC AUTO: 46.7 FL (ref 35–45)
EOSINOPHIL NFR BLD AUTO: 2 %
EOSINOPHILS ABSOLUTE: 0.2 THOU/MM3 (ref 0–0.4)
ERYTHROCYTE [DISTWIDTH] IN BLOOD BY AUTOMATED COUNT: 13.5 % (ref 11.5–14.5)
HCT VFR BLD AUTO: 41.2 % (ref 37–47)
HGB BLD-MCNC: 13.2 GM/DL (ref 12–16)
IMM GRANULOCYTES # BLD AUTO: 0.04 THOU/MM3 (ref 0–0.07)
IMM GRANULOCYTES NFR BLD AUTO: 0.4 %
LYMPHOCYTES ABSOLUTE: 3.3 THOU/MM3 (ref 1–4.8)
LYMPHOCYTES NFR BLD AUTO: 31.2 %
MCH RBC QN AUTO: 30.1 PG (ref 26–33)
MCHC RBC AUTO-ENTMCNC: 32 GM/DL (ref 32.2–35.5)
MCV RBC AUTO: 94.1 FL (ref 81–99)
MONOCYTES ABSOLUTE: 0.8 THOU/MM3 (ref 0.4–1.3)
MONOCYTES NFR BLD AUTO: 7.8 %
NEUTROPHILS ABSOLUTE: 6.2 THOU/MM3 (ref 1.8–7.7)
NEUTROPHILS NFR BLD AUTO: 58 %
NRBC BLD AUTO-RTO: 0 /100 WBC
PLATELET # BLD AUTO: 305 THOU/MM3 (ref 130–400)
PMV BLD AUTO: 12.3 FL (ref 9.4–12.4)
RBC # BLD AUTO: 4.38 MILL/MM3 (ref 4.2–5.4)
TSH SERPL DL<=0.005 MIU/L-ACNC: 1.21 UIU/ML (ref 0.4–4.2)
WBC # BLD AUTO: 10.7 THOU/MM3 (ref 4.8–10.8)

## 2024-11-06 PROCEDURE — 36415 COLL VENOUS BLD VENIPUNCTURE: CPT

## 2024-11-06 PROCEDURE — 85025 COMPLETE CBC W/AUTO DIFF WBC: CPT

## 2024-11-06 PROCEDURE — 83036 HEMOGLOBIN GLYCOSYLATED A1C: CPT

## 2024-11-06 PROCEDURE — 84443 ASSAY THYROID STIM HORMONE: CPT

## 2024-11-07 LAB
DEPRECATED MEAN GLUCOSE BLD GHB EST-ACNC: 93 MG/DL (ref 70–126)
HBA1C MFR BLD HPLC: 5.1 % (ref 4.4–6.4)